# Patient Record
Sex: FEMALE | Race: WHITE | NOT HISPANIC OR LATINO | Employment: OTHER
[De-identification: names, ages, dates, MRNs, and addresses within clinical notes are randomized per-mention and may not be internally consistent; named-entity substitution may affect disease eponyms.]

---

## 2019-06-03 ENCOUNTER — TRANSCRIBE ORDERS (OUTPATIENT)
Dept: SCHEDULING | Age: 54
End: 2019-06-03

## 2019-06-03 DIAGNOSIS — M54.16 RADICULOPATHY OF LUMBAR REGION: ICD-10-CM

## 2019-06-03 DIAGNOSIS — M54.17 RADICULOPATHY OF LUMBOSACRAL REGION: Primary | ICD-10-CM

## 2020-02-25 ENCOUNTER — APPOINTMENT (RX ONLY)
Dept: URBAN - METROPOLITAN AREA CLINIC 374 | Facility: CLINIC | Age: 55
Setting detail: DERMATOLOGY
End: 2020-02-25

## 2020-02-25 DIAGNOSIS — Q826 OTHER SPECIFIED ANOMALIES OF SKIN: ICD-10-CM

## 2020-02-25 DIAGNOSIS — Q819 OTHER SPECIFIED ANOMALIES OF SKIN: ICD-10-CM

## 2020-02-25 DIAGNOSIS — L73.9 FOLLICULAR DISORDER, UNSPECIFIED: ICD-10-CM

## 2020-02-25 DIAGNOSIS — Q828 OTHER SPECIFIED ANOMALIES OF SKIN: ICD-10-CM

## 2020-02-25 DIAGNOSIS — L663 OTHER SPECIFIED DISEASES OF HAIR AND HAIR FOLLICLES: ICD-10-CM

## 2020-02-25 DIAGNOSIS — L738 OTHER SPECIFIED DISEASES OF HAIR AND HAIR FOLLICLES: ICD-10-CM

## 2020-02-25 DIAGNOSIS — L57.0 ACTINIC KERATOSIS: ICD-10-CM | Status: IMPROVED

## 2020-02-25 PROBLEM — Q82.8 OTHER SPECIFIED CONGENITAL MALFORMATIONS OF SKIN: Status: ACTIVE | Noted: 2020-02-25

## 2020-02-25 PROBLEM — L02.02 FURUNCLE OF FACE: Status: ACTIVE | Noted: 2020-02-25

## 2020-02-25 PROCEDURE — ? COUNSELING

## 2020-02-25 PROCEDURE — ? OBSERVATION

## 2020-02-25 PROCEDURE — 17000 DESTRUCT PREMALG LESION: CPT

## 2020-02-25 PROCEDURE — 99213 OFFICE O/P EST LOW 20 MIN: CPT | Mod: 25

## 2020-02-25 PROCEDURE — ? PRESCRIPTION MEDICATION MANAGEMENT

## 2020-02-25 PROCEDURE — ? PRESCRIPTION

## 2020-02-25 PROCEDURE — ? PREMALIGNANT DESTRUCTION

## 2020-02-25 PROCEDURE — 17003 DESTRUCT PREMALG LES 2-14: CPT

## 2020-02-25 RX ORDER — AMMONIUM LACTATE 12 G/100G
12 LOTION TOPICAL BID
Qty: 1 | Refills: 2 | Status: ERX | COMMUNITY
Start: 2020-02-25

## 2020-02-25 RX ADMIN — AMMONIUM LACTATE 12: 12 LOTION TOPICAL at 00:00

## 2020-02-25 ASSESSMENT — LOCATION DETAILED DESCRIPTION DERM
LOCATION DETAILED: RIGHT DISTAL PRETIBIAL REGION
LOCATION DETAILED: RIGHT MID TEMPLE
LOCATION DETAILED: RIGHT INFERIOR FOREHEAD
LOCATION DETAILED: LEFT DISTAL CALF
LOCATION DETAILED: RIGHT MEDIAL MALAR CHEEK
LOCATION DETAILED: RIGHT DISTAL CALF
LOCATION DETAILED: LEFT SUPERIOR CRUS OF ANTIHELIX
LOCATION DETAILED: LEFT INFERIOR CRUS OF ANTIHELIX
LOCATION DETAILED: RIGHT LATERAL EYEBROW
LOCATION DETAILED: RIGHT INFERIOR CRUS OF ANTIHELIX
LOCATION DETAILED: LEFT LATERAL DISTAL PRETIBIAL REGION

## 2020-02-25 ASSESSMENT — LOCATION SIMPLE DESCRIPTION DERM
LOCATION SIMPLE: RIGHT CALF
LOCATION SIMPLE: RIGHT FOREHEAD
LOCATION SIMPLE: RIGHT TEMPLE
LOCATION SIMPLE: RIGHT CHEEK
LOCATION SIMPLE: LEFT PRETIBIAL REGION
LOCATION SIMPLE: RIGHT EYEBROW
LOCATION SIMPLE: LEFT EAR
LOCATION SIMPLE: RIGHT PRETIBIAL REGION
LOCATION SIMPLE: RIGHT EAR
LOCATION SIMPLE: LEFT CALF

## 2020-02-25 ASSESSMENT — LOCATION ZONE DERM
LOCATION ZONE: EAR
LOCATION ZONE: LEG
LOCATION ZONE: FACE

## 2020-02-25 NOTE — HPI: SKIN LESION (ACTINIC KERATOSES)
Is This Lesion Biopsy Proven?: Yes
How Severe Are They?: mild
Is This A New Presentation, Or A Follow-Up?: Follow Up Actinic Keratoses

## 2020-02-25 NOTE — PROCEDURE: PRESCRIPTION MEDICATION MANAGEMENT
Render In Strict Bullet Format?: No
Detail Level: Zone
Continue Regimen: Clindamycin gel QDAY to the rashes of the ears
Initiate Treatment: Ammonium lactate lotion bid to the rashes of the body

## 2020-02-25 NOTE — PROCEDURE: PREMALIGNANT DESTRUCTION
Render Note In Bullet Format When Appropriate: No
Method: liquid nitrogen
Detail Level: Detailed
Anesthesia Volume In Cc: 0
Post-Procedure Care (Optional): The wound was cleaned, and a pressure dressing was applied.  The patient received detailed post-op instructions.
Post-Care Instructions: I reviewed with the patient in detail post-care instructions. Patient is to wear sunprotection, and avoid picking at any of the treated lesions. Pt may apply Vaseline to crusted or scabbing areas.
Consent: The patient's consent was obtained including but not limited to risks of crusting, scabbing, blistering, scarring, darker or lighter pigmentary change, recurrence, incomplete removal and infection.

## 2020-10-27 ENCOUNTER — APPOINTMENT (RX ONLY)
Dept: URBAN - METROPOLITAN AREA CLINIC 374 | Facility: CLINIC | Age: 55
Setting detail: DERMATOLOGY
End: 2020-10-27

## 2020-10-27 DIAGNOSIS — L82.1 OTHER SEBORRHEIC KERATOSIS: ICD-10-CM

## 2020-10-27 DIAGNOSIS — L21.8 OTHER SEBORRHEIC DERMATITIS: ICD-10-CM

## 2020-10-27 DIAGNOSIS — L738 OTHER SPECIFIED DISEASES OF HAIR AND HAIR FOLLICLES: ICD-10-CM

## 2020-10-27 DIAGNOSIS — D22 MELANOCYTIC NEVI: ICD-10-CM

## 2020-10-27 DIAGNOSIS — L663 OTHER SPECIFIED DISEASES OF HAIR AND HAIR FOLLICLES: ICD-10-CM

## 2020-10-27 DIAGNOSIS — D18.0 HEMANGIOMA: ICD-10-CM

## 2020-10-27 DIAGNOSIS — L81.4 OTHER MELANIN HYPERPIGMENTATION: ICD-10-CM

## 2020-10-27 DIAGNOSIS — L73.9 FOLLICULAR DISORDER, UNSPECIFIED: ICD-10-CM

## 2020-10-27 DIAGNOSIS — D17 BENIGN LIPOMATOUS NEOPLASM: ICD-10-CM

## 2020-10-27 DIAGNOSIS — Z85.828 PERSONAL HISTORY OF OTHER MALIGNANT NEOPLASM OF SKIN: ICD-10-CM

## 2020-10-27 DIAGNOSIS — Z71.89 OTHER SPECIFIED COUNSELING: ICD-10-CM

## 2020-10-27 PROBLEM — D18.01 HEMANGIOMA OF SKIN AND SUBCUTANEOUS TISSUE: Status: ACTIVE | Noted: 2020-10-27

## 2020-10-27 PROBLEM — D22.5 MELANOCYTIC NEVI OF TRUNK: Status: ACTIVE | Noted: 2020-10-27

## 2020-10-27 PROBLEM — D17.22 BENIGN LIPOMATOUS NEOPLASM OF SKIN AND SUBCUTANEOUS TISSUE OF LEFT ARM: Status: ACTIVE | Noted: 2020-10-27

## 2020-10-27 PROBLEM — D48.5 NEOPLASM OF UNCERTAIN BEHAVIOR OF SKIN: Status: ACTIVE | Noted: 2020-10-27

## 2020-10-27 PROBLEM — L02.222 FURUNCLE OF BACK [ANY PART, EXCEPT BUTTOCK]: Status: ACTIVE | Noted: 2020-10-27

## 2020-10-27 PROBLEM — L02.423 FURUNCLE OF RIGHT UPPER LIMB: Status: ACTIVE | Noted: 2020-10-27

## 2020-10-27 PROBLEM — L02.12 FURUNCLE OF NECK: Status: ACTIVE | Noted: 2020-10-27

## 2020-10-27 PROBLEM — L02.426 FURUNCLE OF LEFT LOWER LIMB: Status: ACTIVE | Noted: 2020-10-27

## 2020-10-27 PROBLEM — L02.425 FURUNCLE OF RIGHT LOWER LIMB: Status: ACTIVE | Noted: 2020-10-27

## 2020-10-27 PROCEDURE — 99213 OFFICE O/P EST LOW 20 MIN: CPT | Mod: 25

## 2020-10-27 PROCEDURE — ? PRESCRIPTION

## 2020-10-27 PROCEDURE — ? BIOPSY BY SHAVE METHOD

## 2020-10-27 PROCEDURE — ? FULL BODY SKIN EXAM

## 2020-10-27 PROCEDURE — ? COUNSELING

## 2020-10-27 PROCEDURE — ? PRESCRIPTION MEDICATION MANAGEMENT

## 2020-10-27 PROCEDURE — 11102 TANGNTL BX SKIN SINGLE LES: CPT

## 2020-10-27 PROCEDURE — ? PHOTO-DOCUMENTATION

## 2020-10-27 RX ORDER — HYDROCORTISONE 25 MG/G
CREAM TOPICAL BID
Qty: 1 | Refills: 3 | Status: ERX | COMMUNITY
Start: 2020-10-27

## 2020-10-27 RX ORDER — CLINDAMYCIN PHOSPHATE 10 MG/G
GEL TOPICAL
Qty: 1 | Refills: 3 | Status: ERX | COMMUNITY
Start: 2020-10-27

## 2020-10-27 RX ADMIN — HYDROCORTISONE: 25 CREAM TOPICAL at 00:00

## 2020-10-27 RX ADMIN — CLINDAMYCIN PHOSPHATE: 10 GEL TOPICAL at 00:00

## 2020-10-27 ASSESSMENT — LOCATION DETAILED DESCRIPTION DERM
LOCATION DETAILED: LEFT SUPERIOR LATERAL UPPER BACK
LOCATION DETAILED: RIGHT ANTERIOR DISTAL THIGH
LOCATION DETAILED: RIGHT LATERAL SUPERIOR CHEST
LOCATION DETAILED: RIGHT PROXIMAL POSTERIOR UPPER ARM
LOCATION DETAILED: RIGHT SUPERIOR UPPER BACK
LOCATION DETAILED: RIGHT ANTERIOR PROXIMAL UPPER ARM
LOCATION DETAILED: LEFT ANTERIOR DISTAL THIGH
LOCATION DETAILED: LEFT ANTECUBITAL SKIN
LOCATION DETAILED: EPIGASTRIC SKIN
LOCATION DETAILED: RIGHT MEDIAL TRAPEZIAL NECK
LOCATION DETAILED: LEFT CYMBA CONCHA
LOCATION DETAILED: LEFT ANTERIOR PROXIMAL THIGH
LOCATION DETAILED: RIGHT INFERIOR MEDIAL UPPER BACK

## 2020-10-27 ASSESSMENT — LOCATION SIMPLE DESCRIPTION DERM
LOCATION SIMPLE: ABDOMEN
LOCATION SIMPLE: LEFT THIGH
LOCATION SIMPLE: RIGHT UPPER ARM
LOCATION SIMPLE: RIGHT POSTERIOR UPPER ARM
LOCATION SIMPLE: CHEST
LOCATION SIMPLE: RIGHT THIGH
LOCATION SIMPLE: LEFT UPPER ARM
LOCATION SIMPLE: POSTERIOR NECK
LOCATION SIMPLE: LEFT EAR
LOCATION SIMPLE: LEFT UPPER BACK
LOCATION SIMPLE: RIGHT UPPER BACK

## 2020-10-27 ASSESSMENT — LOCATION ZONE DERM
LOCATION ZONE: EAR
LOCATION ZONE: LEG
LOCATION ZONE: TRUNK
LOCATION ZONE: NECK
LOCATION ZONE: ARM

## 2020-10-27 NOTE — PROCEDURE: PRESCRIPTION MEDICATION MANAGEMENT
Initiate Treatment: hydrocortisone 2.5 % topical cream: Apply a thin layer BID to affected areas of ears
Detail Level: Zone
Render In Strict Bullet Format?: No
Continue Regimen: clindamycin 1 % topical gel: Apply to affected area of chest bid
Initiate Treatment: hydrocortisone 2.5 % topical cream: Apply a thin layer BID to affected areas of arms

## 2021-05-09 ENCOUNTER — HOSPITAL ENCOUNTER (EMERGENCY)
Facility: HOSPITAL | Age: 56
Discharge: HOME | End: 2021-05-09
Attending: EMERGENCY MEDICINE
Payer: MEDICARE

## 2021-05-09 ENCOUNTER — APPOINTMENT (EMERGENCY)
Dept: RADIOLOGY | Facility: HOSPITAL | Age: 56
End: 2021-05-09
Attending: EMERGENCY MEDICINE
Payer: MEDICARE

## 2021-05-09 VITALS
OXYGEN SATURATION: 97 % | DIASTOLIC BLOOD PRESSURE: 77 MMHG | TEMPERATURE: 98 F | RESPIRATION RATE: 15 BRPM | HEIGHT: 58 IN | BODY MASS INDEX: 47.65 KG/M2 | HEART RATE: 108 BPM | SYSTOLIC BLOOD PRESSURE: 131 MMHG | WEIGHT: 227 LBS

## 2021-05-09 DIAGNOSIS — R07.9 CHEST PAIN, UNSPECIFIED TYPE: Primary | ICD-10-CM

## 2021-05-09 LAB
ALBUMIN SERPL-MCNC: 4.1 G/DL (ref 3.4–5)
ALP SERPL-CCNC: 70 IU/L (ref 35–126)
ALT SERPL-CCNC: 37 IU/L (ref 11–54)
ANION GAP SERPL CALC-SCNC: 9 MEQ/L (ref 3–15)
AST SERPL-CCNC: 24 IU/L (ref 15–41)
BASOPHILS # BLD: 0.06 K/UL (ref 0.01–0.1)
BASOPHILS NFR BLD: 0.8 %
BILIRUB SERPL-MCNC: 0.4 MG/DL (ref 0.3–1.2)
BNP SERPL-MCNC: 10 PG/ML
BUN SERPL-MCNC: 15 MG/DL (ref 8–20)
CALCIUM SERPL-MCNC: 8.6 MG/DL (ref 8.9–10.3)
CHLORIDE SERPL-SCNC: 102 MEQ/L (ref 98–109)
CO2 SERPL-SCNC: 28 MEQ/L (ref 22–32)
CREAT SERPL-MCNC: 1 MG/DL (ref 0.6–1.1)
D DIMER PPP IA.FEU-MCNC: 0.37 UG/ML FEU (ref 0–0.5)
DIFFERENTIAL METHOD BLD: NORMAL
EOSINOPHIL # BLD: 0.36 K/UL (ref 0.04–0.36)
EOSINOPHIL NFR BLD: 4.9 %
ERYTHROCYTE [DISTWIDTH] IN BLOOD BY AUTOMATED COUNT: 13 % (ref 11.7–14.4)
GFR SERPL CREATININE-BSD FRML MDRD: 57.8 ML/MIN/1.73M*2
GLUCOSE SERPL-MCNC: 161 MG/DL (ref 70–99)
HCT VFR BLDCO AUTO: 42.7 % (ref 35–45)
HGB BLD-MCNC: 13.9 G/DL (ref 11.8–15.7)
IMM GRANULOCYTES # BLD AUTO: 0.03 K/UL (ref 0–0.08)
IMM GRANULOCYTES NFR BLD AUTO: 0.4 %
LYMPHOCYTES # BLD: 2.97 K/UL (ref 1.2–3.5)
LYMPHOCYTES NFR BLD: 40.1 %
MCH RBC QN AUTO: 29.9 PG (ref 28–33.2)
MCHC RBC AUTO-ENTMCNC: 32.6 G/DL (ref 32.2–35.5)
MCV RBC AUTO: 91.8 FL (ref 83–98)
MONOCYTES # BLD: 0.69 K/UL (ref 0.28–0.8)
MONOCYTES NFR BLD: 9.3 %
NEUTROPHILS # BLD: 3.3 K/UL (ref 1.7–7)
NEUTS SEG NFR BLD: 44.5 %
NRBC BLD-RTO: 0 %
PDW BLD AUTO: 9.9 FL (ref 9.4–12.3)
PLATELET # BLD AUTO: 264 K/UL (ref 150–369)
POTASSIUM SERPL-SCNC: 3.5 MEQ/L (ref 3.6–5.1)
PROT SERPL-MCNC: 8 G/DL (ref 6–8.2)
RBC # BLD AUTO: 4.65 M/UL (ref 3.93–5.22)
SODIUM SERPL-SCNC: 139 MEQ/L (ref 136–144)
TROPONIN I SERPL-MCNC: <0.02 NG/ML
WBC # BLD AUTO: 7.41 K/UL (ref 3.8–10.5)

## 2021-05-09 PROCEDURE — 80053 COMPREHEN METABOLIC PANEL: CPT | Performed by: EMERGENCY MEDICINE

## 2021-05-09 PROCEDURE — 85379 FIBRIN DEGRADATION QUANT: CPT | Performed by: PHYSICIAN ASSISTANT

## 2021-05-09 PROCEDURE — 71046 X-RAY EXAM CHEST 2 VIEWS: CPT

## 2021-05-09 PROCEDURE — 3E0337Z INTRODUCTION OF ELECTROLYTIC AND WATER BALANCE SUBSTANCE INTO PERIPHERAL VEIN, PERCUTANEOUS APPROACH: ICD-10-PCS | Performed by: EMERGENCY MEDICINE

## 2021-05-09 PROCEDURE — 36415 COLL VENOUS BLD VENIPUNCTURE: CPT | Performed by: EMERGENCY MEDICINE

## 2021-05-09 PROCEDURE — 96374 THER/PROPH/DIAG INJ IV PUSH: CPT

## 2021-05-09 PROCEDURE — 85025 COMPLETE CBC W/AUTO DIFF WBC: CPT | Performed by: EMERGENCY MEDICINE

## 2021-05-09 PROCEDURE — 93005 ELECTROCARDIOGRAM TRACING: CPT | Performed by: EMERGENCY MEDICINE

## 2021-05-09 PROCEDURE — 84484 ASSAY OF TROPONIN QUANT: CPT | Performed by: EMERGENCY MEDICINE

## 2021-05-09 PROCEDURE — 83880 ASSAY OF NATRIURETIC PEPTIDE: CPT | Performed by: EMERGENCY MEDICINE

## 2021-05-09 PROCEDURE — 99284 EMERGENCY DEPT VISIT MOD MDM: CPT | Mod: 25

## 2021-05-09 PROCEDURE — 63600000 HC DRUGS/DETAIL CODE: Performed by: PHYSICIAN ASSISTANT

## 2021-05-09 RX ORDER — LIRAGLUTIDE 6 MG/ML
1.8 INJECTION SUBCUTANEOUS DAILY
COMMUNITY
End: 2023-04-17

## 2021-05-09 RX ORDER — BREXPIPRAZOLE 4 MG/1
4 TABLET ORAL NIGHTLY
COMMUNITY

## 2021-05-09 RX ORDER — METFORMIN HYDROCHLORIDE 500 MG/1
500 TABLET, EXTENDED RELEASE ORAL 2 TIMES DAILY WITH MEALS
COMMUNITY

## 2021-05-09 RX ORDER — CLONAZEPAM 0.5 MG/1
0.5 TABLET ORAL NIGHTLY
COMMUNITY

## 2021-05-09 RX ORDER — AMLODIPINE BESYLATE 2.5 MG/1
2.5 TABLET ORAL AS NEEDED
COMMUNITY

## 2021-05-09 RX ORDER — ASPIRIN 81 MG/1
81 TABLET ORAL NIGHTLY
COMMUNITY

## 2021-05-09 RX ORDER — TRANYLCYPROMINE 10 MG/1
20 TABLET ORAL 3 TIMES DAILY
COMMUNITY

## 2021-05-09 RX ORDER — ASCORBIC ACID 125 MG
170 TABLET,CHEWABLE ORAL NIGHTLY
COMMUNITY
End: 2023-04-17

## 2021-05-09 RX ORDER — NAPROXEN SODIUM 220 MG/1
TABLET ORAL
COMMUNITY
End: 2023-04-17 | Stop reason: ENTERED-IN-ERROR

## 2021-05-09 RX ORDER — GABAPENTIN 300 MG/1
300 CAPSULE ORAL
COMMUNITY

## 2021-05-09 RX ORDER — KETOROLAC TROMETHAMINE 30 MG/ML
30 INJECTION, SOLUTION INTRAMUSCULAR; INTRAVENOUS ONCE
Status: COMPLETED | OUTPATIENT
Start: 2021-05-09 | End: 2021-05-09

## 2021-05-09 RX ORDER — ARIPIPRAZOLE 5 MG/1
5 TABLET ORAL
COMMUNITY

## 2021-05-09 RX ORDER — INSULIN DEGLUDEC 100 U/ML
25 INJECTION, SOLUTION SUBCUTANEOUS NIGHTLY
COMMUNITY

## 2021-05-09 RX ORDER — INSULIN ASPART 100 [IU]/ML
INJECTION, SOLUTION INTRAVENOUS; SUBCUTANEOUS
COMMUNITY

## 2021-05-09 RX ORDER — DOCUSATE SODIUM 100 MG/1
100 CAPSULE, LIQUID FILLED ORAL AS NEEDED
COMMUNITY
End: 2023-04-17

## 2021-05-09 RX ADMIN — KETOROLAC TROMETHAMINE 30 MG: 30 INJECTION, SOLUTION INTRAMUSCULAR; INTRAVENOUS at 10:27

## 2021-05-09 ASSESSMENT — ENCOUNTER SYMPTOMS
FEVER: 0
COUGH: 0
WHEEZING: 0
NECK PAIN: 0
SHORTNESS OF BREATH: 0
ACTIVITY CHANGE: 0
DIARRHEA: 0
SEIZURES: 0
HEMATURIA: 0
VOMITING: 0
DIFFICULTY URINATING: 0
ABDOMINAL PAIN: 0
NAUSEA: 0
SORE THROAT: 0
AGITATION: 0
HEADACHES: 0
COLOR CHANGE: 0
FACIAL SWELLING: 0
DIAPHORESIS: 0
BACK PAIN: 0
SPEECH DIFFICULTY: 0
WEAKNESS: 0

## 2021-05-09 NOTE — ED PROVIDER NOTES
"Emergency Medicine Note  HPI   HISTORY OF PRESENT ILLNESS     HPI   56-year-old female with past medical history of anxiety, depression, hypertension, DM2, cognitive impairment, CAD who presents for evaluation of right-sided chest pain.  States while \"pouring my meds\" she developed sudden onset right-sided chest pain described as sharp pain with radiation to her right back.  Pain was intense for approximately an hour, seemed to improve in route here and currently denies pain.  She reports intermittent pain in her right arm yesterday.  Denies injury, trauma or strain.  Admits to slight shortness of breath she attributes to the mask.  Denies fever, cough, lower extremity swelling, pain or rash, recent hospitalizations/surgeries or travel. No personal history of DVT or PE.  Her heart rate here is  ~112.  She notes she does have an elevated heart rate that is usually around 100.      CardiologistHamilton Fried         Patient History   PAST HISTORY     Reviewed from Nursing Triage:      Past Medical History:   Diagnosis Date   • CHF (congestive heart failure) (CMS/HCC)    • Coronary artery disease    • Myocardial infarct (CMS/HCC)    • Sleep apnea    • Stroke (CMS/HCC)    • Type 2 diabetes mellitus (CMS/HCC)        History reviewed. No pertinent surgical history.    History reviewed. No pertinent family history.    Social History     Tobacco Use   • Smoking status: Never Smoker   • Smokeless tobacco: Never Used   Substance Use Topics   • Alcohol use: Never   • Drug use: Never         Review of Systems   REVIEW OF SYSTEMS     Review of Systems   Constitutional: Negative for activity change, diaphoresis and fever.   HENT: Negative for facial swelling and sore throat.    Eyes: Negative for visual disturbance.   Respiratory: Negative for cough, shortness of breath and wheezing.    Cardiovascular: Positive for chest pain. Negative for leg swelling.   Gastrointestinal: Negative for abdominal pain, diarrhea, nausea and vomiting. "   Genitourinary: Negative for difficulty urinating and hematuria.   Musculoskeletal: Negative for back pain and neck pain.   Skin: Negative for color change and pallor.   Neurological: Negative for seizures, syncope, speech difficulty, weakness and headaches.   Psychiatric/Behavioral: Negative for agitation and behavioral problems.   All other systems reviewed and are negative.        VITALS     ED Vitals    Date/Time Temp Pulse Resp BP SpO2 [facility]   05/09/21 0818 36.7 °C (98 °F) 119 18 143/85 100 % RB                       Physical Exam   PHYSICAL EXAM     Physical Exam  Vitals and nursing note reviewed.   Constitutional:       Appearance: She is obese.   HENT:      Head: Normocephalic and atraumatic.   Eyes:      Conjunctiva/sclera: Conjunctivae normal.   Cardiovascular:      Rate and Rhythm: Normal rate and regular rhythm.   Pulmonary:      Effort: Pulmonary effort is normal.      Breath sounds: Normal breath sounds.   Abdominal:      General: There is no distension.   Musculoskeletal:         General: No tenderness or deformity. Normal range of motion.      Cervical back: Normal range of motion.      Comments:  No calf or thigh swelling, erythema, tenderness, palpable cords bilaterally   Skin:     General: Skin is warm and dry.   Neurological:      General: No focal deficit present.      Mental Status: She is alert. Mental status is at baseline.   Psychiatric:         Behavior: Behavior normal.           PROCEDURES     ECG 12 lead    Date/Time: 5/9/2021 10:29 AM  Performed by: Jessica Prieto PA C  Authorized by: Lynsey Arita MD     Rate:     ECG rate:  122    ECG rate assessment: tachycardic    Rhythm:     Rhythm: sinus tachycardia    Ectopy:     Ectopy: none    QRS:     QRS axis:  Normal  ST segments:     ST segments:  Normal  T waves:     T waves: normal           DATA     Results     None          Imaging Results    None         ECG 12 lead    (Results Pending)       Scoring tools                                  ED Course & MDM   MDM / ED COURSE and CLINICAL IMPRESSIONS     [facility]    ED Course as of May 09 1034   Sun May 09, 2021   0845 Impression/Plan:    CP sudden onset while at rest x 1 hour. Subsided en route here. Currently denies chest pain. Ecg, cardiac monitor, labs, CXR, reeval    [JV]   0848 Pharmacologic myocardial perfusion test 9/2020:  Borderline findings in the anterior wall which most likely represent   breast artifact.  Defects are created by attenuation correction in the   setting of low counts.  No ischemia on the raw images but mild severity   anterior defect most likely related to breast artifact obscures portions   of the anterior wall.   The results are consistent with low cardiac risk findings.    When compared to the study from 09/13/2017, the previously noted anterior   mid wall thinning and anteroseptal thinning is still appreciated in the   anterior wall but not in the anteroseptum.  Attenuation correction no   longer improves the artifact but actually appears to create artifact.     Clinical correlation is needed due to the anterior wall thinning and   breast artifact.       [JV]   0912 WBC: 7.41 [JV]   0912 Troponin I: <0.02 [JV]   0928 D-Dimer, Quant: 0.37 [JV]   0947 CXR IMPRESSION: No acute interval radiographic change or abnormality is demonstrated  in the chest.  Clinical correlation advised.  See comment.    [JV]   0954 Labs unremarkable including D-dimer and troponin.  She does intermittently have recurrent chest pain while in the ED here.  Currently a 3 out of 10.  We will try Toradol prior to departure    [JV]   0954 Chest pain improved from a 7 to a 2 out of 10.  Patient resting comfortably in bed in no acute distress.  Heart rate improved from 119 to 104.  She notes this is her baseline heart rate.  Comfortable with plan for discharge home, close follow-up with cardiologist.  Return precautions discussed.    [JV]      ED Course User Index  [JV] Jessica Prieto PA C          Clinical Impressions as of May 09 1034   Chest pain, unspecified type            Jessica Prieto PA C  05/09/21 1032

## 2021-05-09 NOTE — ED ATTESTATION NOTE
-----------------------------------------------------------  ED Attending Note  5/9/2021, 9:06 AM    I supervised care provided by the PA (Ida). We have discussed the case. I have reviewed their note and agree with the plan of treatment. I personally interviewed the patient and examined the patient.    HISTORY OF PRESENT ILLNESS     Chief Complaint   Patient presents with   • Chest Pain     Laurence Chang is a 56 y.o. female with the PMH below who comes to the ED today with the chief complaint of sudden onset of a sharp R sided chest pain that radiated to her back. Sx began at 6am, lasted for about an hour and then subsided. Arrived in ED pain free. It was 3/10 at worst. No f/c, LE pain/swelling. H/o ST, baseline .    PAST HISTORY     Past Medical History:   Diagnosis Date   • CHF (congestive heart failure) (CMS/[facility])    • Coronary artery disease    • Myocardial infarct (CMS/HCC)    • Sleep apnea    • Stroke (CMS/HCC)    • Type 2 diabetes mellitus (CMS/[facility])      There is no problem list on file for this patient.    History reviewed. No pertinent surgical history.    VITAL SIGNS     ED Vitals    Date/Time Temp Pulse Resp BP SpO2 [facility]   05/09/21 1015 -- 108 15 131/77 97 % RB   05/09/21 0945 -- 104 16 143/78 99 % RB   05/09/21 0858 -- 106 18 -- 100 % RB   05/09/21 0818 36.7 °C (98 °F) 119 18 143/85 100 % RB          PHYSICAL EXAM   Physical Exam  Vitals reviewed.   HENT:      Head: Normocephalic and atraumatic.   Cardiovascular:      Rate and Rhythm: Regular rhythm. Tachycardia present.      Heart sounds: Normal heart sounds.   Pulmonary:      Effort: Pulmonary effort is normal. No respiratory distress.   Abdominal:      Palpations: Abdomen is soft.      Tenderness: There is no abdominal tenderness.   Neurological:      Mental Status: She is alert and oriented to person, place, and time.       DATA     Vital sign review: SpO2: 100 % on room air. Interpretation: normal    Results     Procedure Component Value  Units Date/Time    D-dimer, quantitative [[id number]]  (Normal) Collected: 05/09/21 0839    Specimen: Blood, Venous Updated: 05/09/21 0922     D-Dimer, Quant 0.37 ug/mL FEU      Comment: Suggested Age Related Reference Range: 0.00 - 0.54  The D-Dimer assay can be used as an aid in the diagnosis of DVT or PE. The test can not be used by itself to exclude DVT or PE. When used as a diagnostic aid, the cutoff value is the same as the reference range: <0.5 ug/ml FEU.       B-type natriuretic peptide [[id number]]  (Normal) Collected: 05/09/21 0839    Specimen: Blood, Venous Updated: 05/09/21 0914     BNP 10 pg/mL     Troponin I [[id number]]  (Normal) Collected: 05/09/21 0839    Specimen: Blood, Venous Updated: 05/09/21 0911     Troponin I <0.02 ng/mL     Comprehensive metabolic panel [[id number]]  (Abnormal) Collected: 05/09/21 0839    Specimen: Blood, Venous Updated: 05/09/21 0905     Sodium 139 mEQ/L      Potassium 3.5 mEQ/L      Comment: Results obtained on plasma. Plasma Potassium values may be up to 0.4 mEQ/L less than serum values. The differences may be greater for patients with high platelet or white cell counts.        Chloride 102 mEQ/L      CO2 28 mEQ/L      BUN 15 mg/dL      Creatinine 1.0 mg/dL      Glucose 161 mg/dL      Calcium 8.6 mg/dL      AST (SGOT) 24 IU/L      ALT (SGPT) 37 IU/L      Alkaline Phosphatase 70 IU/L      Total Protein 8.0 g/dL      Comment: Test performed on plasma which typically contains approximately 0.4 g/dL more protein than serum.        Albumin 4.1 g/dL      Bilirubin, Total 0.4 mg/dL      eGFR 57.8 mL/min/1.73m*2      Anion Gap 9 mEQ/L     CBC and Differential [[id number]] Collected: 05/09/21 0839    Specimen: Blood, Venous Updated: 05/09/21 0853     WBC 7.41 K/uL      RBC 4.65 M/uL      Hemoglobin 13.9 g/dL      Hematocrit 42.7 %      MCV 91.8 fL      MCH 29.9 pg      MCHC 32.6 g/dL      RDW 13.0 %      Platelets 264 K/uL      MPV 9.9 fL      Differential Type Auto     nRBC 0.0 %       Immature Granulocytes 0.4 %      Neutrophils 44.5 %      Lymphocytes 40.1 %      Monocytes 9.3 %      Eosinophils 4.9 %      Basophils 0.8 %      Immature Granulocytes, Absolute 0.03 K/uL      Neutrophils, Absolute 3.30 K/uL      Lymphocytes, Absolute 2.97 K/uL      Monocytes, Absolute 0.69 K/uL      Eosinophils, Absolute 0.36 K/uL      Basophils, Absolute 0.06 K/uL     RAINBOW RED [[id number]] Collected: 05/09/21 0839    Specimen: Blood, Venous Updated: 05/09/21 0845    RAINBOW LT BLUE [[id number]] Collected: 05/09/21 0839    Specimen: Blood, Venous Updated: 05/09/21 0845    [place] Draw Panel [[id number]] Collected: 05/09/21 0839    Specimen: Blood, Venous Updated: 05/09/21 0845    Narrative:      The following orders were created for panel order [place] Draw Panel.  Procedure                               Abnormality         Status                     ---------                               -----------         ------                     RAINBOW RED[[id number]]                                      In process                 RAINBOW LT BLUE[[id number]]                                  In process                 RAINBOW GOLD[[id number]]                                     In process                   Please view results for these tests on the individual orders.    RAINBOW GOLD [[id number]] Collected: 05/09/21 0839    Specimen: Blood, Venous Updated: 05/09/21 0845        ECG 12 lead   ED Interpretation   Jessica Prieto PA C     5/9/2021 10:35 AM   ECG 12 lead      Date/Time: 5/9/2021 10:29 AM   Performed by: Jessica Prieto PA C   Authorized by: Lynsey Arita MD       Rate:      ECG rate:  122     ECG rate assessment: tachycardic     Rhythm:      Rhythm: sinus tachycardia     Ectopy:      Ectopy: none     QRS:      QRS axis:  Normal   ST segments:      ST segments:  Normal   T waves:      T waves: normal          Imaging Results          X-RAY CHEST 2 VIEWS (Final result)  Result time 05/09/21 09:33:22    Final  result                 Impression:    IMPRESSION: No acute interval radiographic change or abnormality is demonstrated  in the chest.  Clinical correlation advised.  See comment.                 Narrative:    CLINICAL HISTORY: Chest pain    COMMENT: PA erect frontal and lateral views the chest were obtained.  There are  previous remote radiographs of September 2009.    Lungs remain moderately well expanded.  They are symmetrically aerated.  There  is no new parenchymal abnormality.  There is no pleural fluid or pleural air.  Central vasculature is again mildly prominent.  There is stable midline without  significant change or abnormality.  Overlying monitoring leads and wires are  present.  There is no acute osseous or soft tissue change.                                   MDM / ED COURSE and CLINICAL IMPRESSIONS     ED Course as of May 09 1557   Sun May 09, 2021   0845 Impression/Plan:    CP sudden onset while at rest x 1 hour. Subsided en route here. Currently denies chest pain. Ecg, cardiac monitor, labs, CXR, reeval    [JV]   0848 Pharmacologic myocardial perfusion test 9/2020:  Borderline findings in the anterior wall which most likely represent   breast artifact.  Defects are created by attenuation correction in the   setting of low counts.  No ischemia on the raw images but mild severity   anterior defect most likely related to breast artifact obscures portions   of the anterior wall.   The results are consistent with low cardiac risk findings.    When compared to the study from 09/13/2017, the previously noted anterior   mid wall thinning and anteroseptal thinning is still appreciated in the   anterior wall but not in the anteroseptum.  Attenuation correction no   longer improves the artifact but actually appears to create artifact.     Clinical correlation is needed due to the anterior wall thinning and   breast artifact.       [JV]   0912 WBC: 7.41 [JV]   0912 Troponin I: <0.02 [JV]   0928 D-Dimer, Quant:  0.37 [JV]   0947 CXR IMPRESSION: No acute interval radiographic change or abnormality is demonstrated  in the chest.  Clinical correlation advised.  See comment.    [JV]   0954 Labs unremarkable including D-dimer and troponin.  She does intermittently have recurrent chest pain while in the ED here.  Currently a 3 out of 10.  We will try Toradol prior to departure    [JV]   0954 Chest pain improved from a 7 to a 2 out of 10.  Patient resting comfortably in bed in no acute distress.  Heart rate improved from 119 to 104.  She notes this is her baseline heart rate.  Comfortable with plan for discharge home, close follow-up with cardiologist.  Return precautions discussed.    [JV]      ED Course User Index  [JV] Jessica Prieto PA C         Clinical Impressions as of May 09 1557   Chest pain, unspecified type       -----------------------------  Lynsey Arita MD  5/9/2021, 9:06 AM  -----------------------------------------------------------     Lynsey Arita MD  05/14/21 1312

## 2021-05-09 NOTE — DISCHARGE INSTRUCTIONS
Your lab work, chest x-ray were within normal limits.  Take acetaminophen (ie: Tylenol) and ibuprofen (ie: Advil or Motrin) as per package instructions for pain control.  You should not take more than 3 g of acetaminophen or 2400mg ibuprofren in 24 hours.  Always take ibuprofen with something in your stomach. Please be sure to drink plenty of fluids.   Follow-up with your family doctor and cardiologist regarding today's visit.  Return with any new or concerning symptoms.

## 2021-05-10 LAB
ATRIAL RATE: 122
P AXIS: 57
PR INTERVAL: 150
QRS DURATION: 78
QT INTERVAL: 332
QTC CALCULATION(BAZETT): 473
R AXIS: 33
T WAVE AXIS: -4
VENTRICULAR RATE: 122

## 2021-06-04 ENCOUNTER — APPOINTMENT (RX ONLY)
Dept: URBAN - METROPOLITAN AREA CLINIC 374 | Facility: CLINIC | Age: 56
Setting detail: DERMATOLOGY
End: 2021-06-04

## 2021-06-04 DIAGNOSIS — L57.8 OTHER SKIN CHANGES DUE TO CHRONIC EXPOSURE TO NONIONIZING RADIATION: ICD-10-CM

## 2021-06-04 DIAGNOSIS — D17 BENIGN LIPOMATOUS NEOPLASM: ICD-10-CM

## 2021-06-04 PROBLEM — D17.22 BENIGN LIPOMATOUS NEOPLASM OF SKIN AND SUBCUTANEOUS TISSUE OF LEFT ARM: Status: ACTIVE | Noted: 2021-06-04

## 2021-06-04 PROCEDURE — ? TREATMENT REGIMEN

## 2021-06-04 PROCEDURE — ? SUNSCREEN RECOMMENDATIONS

## 2021-06-04 PROCEDURE — 99213 OFFICE O/P EST LOW 20 MIN: CPT

## 2021-06-04 PROCEDURE — ? COUNSELING

## 2021-06-04 ASSESSMENT — LOCATION SIMPLE DESCRIPTION DERM
LOCATION SIMPLE: RIGHT FOREARM
LOCATION SIMPLE: LEFT FOREARM
LOCATION SIMPLE: LEFT UPPER ARM

## 2021-06-04 ASSESSMENT — LOCATION DETAILED DESCRIPTION DERM
LOCATION DETAILED: RIGHT PROXIMAL DORSAL FOREARM
LOCATION DETAILED: LEFT ANTECUBITAL SKIN
LOCATION DETAILED: LEFT PROXIMAL DORSAL FOREARM

## 2021-06-04 ASSESSMENT — LOCATION ZONE DERM: LOCATION ZONE: ARM

## 2021-06-04 NOTE — PROCEDURE: TREATMENT REGIMEN
Detail Level: Simple
Plan: Get copy of records including any ultrasound reports for lesion of left arm that were done at [place] [facility].  T/C referral to general surgery for further evaluation and removal of lesion due to location at antecubital fossa.  Discussed with patient that I prefer she see surgeon because this location of the body contains several nerves and makes this surgery a little more complicated and risky to due in our office
Plan: Sunscreen use

## 2021-08-17 ENCOUNTER — APPOINTMENT (EMERGENCY)
Dept: RADIOLOGY | Facility: HOSPITAL | Age: 56
End: 2021-08-17
Payer: MEDICARE

## 2021-08-17 ENCOUNTER — HOSPITAL ENCOUNTER (EMERGENCY)
Facility: HOSPITAL | Age: 56
Discharge: HOME | End: 2021-08-17
Attending: EMERGENCY MEDICINE
Payer: MEDICARE

## 2021-08-17 VITALS
WEIGHT: 219 LBS | DIASTOLIC BLOOD PRESSURE: 70 MMHG | TEMPERATURE: 98.6 F | RESPIRATION RATE: 18 BRPM | SYSTOLIC BLOOD PRESSURE: 130 MMHG | BODY MASS INDEX: 45.97 KG/M2 | HEIGHT: 58 IN | OXYGEN SATURATION: 97 % | HEART RATE: 112 BPM

## 2021-08-17 DIAGNOSIS — R07.89 CHEST WALL PAIN: Primary | ICD-10-CM

## 2021-08-17 LAB
ALBUMIN SERPL-MCNC: 4.1 G/DL (ref 3.4–5)
ALP SERPL-CCNC: 55 IU/L (ref 35–126)
ALT SERPL-CCNC: 36 IU/L (ref 11–54)
ANION GAP SERPL CALC-SCNC: 10 MEQ/L (ref 3–15)
AST SERPL-CCNC: 27 IU/L (ref 15–41)
BASOPHILS # BLD: 0.06 K/UL (ref 0.01–0.1)
BASOPHILS NFR BLD: 0.7 %
BILIRUB SERPL-MCNC: 0.6 MG/DL (ref 0.3–1.2)
BNP SERPL-MCNC: 15 PG/ML
BUN SERPL-MCNC: 17 MG/DL (ref 8–20)
CALCIUM SERPL-MCNC: 9.2 MG/DL (ref 8.9–10.3)
CHLORIDE SERPL-SCNC: 102 MEQ/L (ref 98–109)
CO2 SERPL-SCNC: 25 MEQ/L (ref 22–32)
CREAT SERPL-MCNC: 0.8 MG/DL (ref 0.6–1.1)
DIFFERENTIAL METHOD BLD: ABNORMAL
EOSINOPHIL # BLD: 0.4 K/UL (ref 0.04–0.36)
EOSINOPHIL NFR BLD: 5 %
ERYTHROCYTE [DISTWIDTH] IN BLOOD BY AUTOMATED COUNT: 12.1 % (ref 11.7–14.4)
GFR SERPL CREATININE-BSD FRML MDRD: >60 ML/MIN/1.73M*2
GLUCOSE SERPL-MCNC: 182 MG/DL (ref 70–99)
HCT VFR BLDCO AUTO: 40.5 % (ref 35–45)
HGB BLD-MCNC: 13.2 G/DL (ref 11.8–15.7)
IMM GRANULOCYTES # BLD AUTO: 0.03 K/UL (ref 0–0.08)
IMM GRANULOCYTES NFR BLD AUTO: 0.4 %
LYMPHOCYTES # BLD: 2.58 K/UL (ref 1.2–3.5)
LYMPHOCYTES NFR BLD: 32.1 %
MCH RBC QN AUTO: 29.8 PG (ref 28–33.2)
MCHC RBC AUTO-ENTMCNC: 32.6 G/DL (ref 32.2–35.5)
MCV RBC AUTO: 91.4 FL (ref 83–98)
MONOCYTES # BLD: 0.64 K/UL (ref 0.28–0.8)
MONOCYTES NFR BLD: 8 %
NEUTROPHILS # BLD: 4.32 K/UL (ref 1.7–7)
NEUTS SEG NFR BLD: 53.8 %
NRBC BLD-RTO: 0 %
PDW BLD AUTO: 10.4 FL (ref 9.4–12.3)
PLATELET # BLD AUTO: 277 K/UL (ref 150–369)
POTASSIUM SERPL-SCNC: 4.5 MEQ/L (ref 3.6–5.1)
PROT SERPL-MCNC: 7.7 G/DL (ref 6–8.2)
RBC # BLD AUTO: 4.43 M/UL (ref 3.93–5.22)
SODIUM SERPL-SCNC: 137 MEQ/L (ref 136–144)
T4 FREE SERPL-MCNC: 0.79 NG/DL (ref 0.58–1.64)
TROPONIN I SERPL-MCNC: <0.02 NG/ML
TSH SERPL DL<=0.05 MIU/L-ACNC: 0.07 MIU/L (ref 0.34–5.6)
WBC # BLD AUTO: 8.03 K/UL (ref 3.8–10.5)

## 2021-08-17 PROCEDURE — 80053 COMPREHEN METABOLIC PANEL: CPT | Performed by: EMERGENCY MEDICINE

## 2021-08-17 PROCEDURE — 85025 COMPLETE CBC W/AUTO DIFF WBC: CPT | Performed by: EMERGENCY MEDICINE

## 2021-08-17 PROCEDURE — 3E0337Z INTRODUCTION OF ELECTROLYTIC AND WATER BALANCE SUBSTANCE INTO PERIPHERAL VEIN, PERCUTANEOUS APPROACH: ICD-10-PCS | Performed by: EMERGENCY MEDICINE

## 2021-08-17 PROCEDURE — 84484 ASSAY OF TROPONIN QUANT: CPT | Performed by: EMERGENCY MEDICINE

## 2021-08-17 PROCEDURE — 83880 ASSAY OF NATRIURETIC PEPTIDE: CPT | Performed by: PHYSICIAN ASSISTANT

## 2021-08-17 PROCEDURE — 80053 COMPREHEN METABOLIC PANEL: CPT

## 2021-08-17 PROCEDURE — 93005 ELECTROCARDIOGRAM TRACING: CPT

## 2021-08-17 PROCEDURE — 85025 COMPLETE CBC W/AUTO DIFF WBC: CPT

## 2021-08-17 PROCEDURE — 96360 HYDRATION IV INFUSION INIT: CPT

## 2021-08-17 PROCEDURE — 84443 ASSAY THYROID STIM HORMONE: CPT | Performed by: PHYSICIAN ASSISTANT

## 2021-08-17 PROCEDURE — 96361 HYDRATE IV INFUSION ADD-ON: CPT

## 2021-08-17 PROCEDURE — 36415 COLL VENOUS BLD VENIPUNCTURE: CPT

## 2021-08-17 PROCEDURE — 25800000 HC PHARMACY IV SOLUTIONS: Performed by: PHYSICIAN ASSISTANT

## 2021-08-17 PROCEDURE — 84439 ASSAY OF FREE THYROXINE: CPT | Performed by: PHYSICIAN ASSISTANT

## 2021-08-17 PROCEDURE — 93005 ELECTROCARDIOGRAM TRACING: CPT | Performed by: EMERGENCY MEDICINE

## 2021-08-17 PROCEDURE — 71046 X-RAY EXAM CHEST 2 VIEWS: CPT

## 2021-08-17 PROCEDURE — 99284 EMERGENCY DEPT VISIT MOD MDM: CPT | Mod: 25

## 2021-08-17 RX ADMIN — SODIUM CHLORIDE 500 ML: 9 INJECTION, SOLUTION INTRAVENOUS at 20:54

## 2021-08-17 ASSESSMENT — ENCOUNTER SYMPTOMS
NAUSEA: 0
VOMITING: 0
FEVER: 0
CHILLS: 0
SHORTNESS OF BREATH: 0
ABDOMINAL PAIN: 0
COUGH: 0

## 2021-08-17 NOTE — ED PROVIDER NOTES
Emergency Medicine Note  HPI   HISTORY OF PRESENT ILLNESS     56-year-old female with history of CHF, CAD, MI, sleep apnea, CVA, type 2 diabetes, presents the emergency department for chest pain.  Patient reports on Sunday she started with right-sided chest pain, but only with palpation.  Seen at an urgent care, diagnosed with costochondritis.  Patient was referred to the ER at that time, but states she went home because she was too tired.  Patient decided to come in today, because now the pain has moved to the left side of her chest.  Denies nausea, vomiting, diaphoresis, shortness of breath, fever, chills, cough.  Patient is unsure what it felt like when she had her last MI, so unsure if the symptoms are similar.  Patient had a nuclear stress test done at [facility] in September 2020 that was unremarkable.  Patient also notes that she has been working with her endocrinologist because her thyroid function has been abnormal, and she has been feeling tired and depressed over this.  Patient has not had any recent travel, recent surgery, is not on OCP.            Patient History   PAST HISTORY     Reviewed from Nursing Triage: Tobacco  Allergies  Meds  Problems  Med Hx  Surg Hx  Fam Hx  Soc Hx        Past Medical History:   Diagnosis Date   • CHF (congestive heart failure) (CMS/HCC)    • Coronary artery disease    • Myocardial infarct (CMS/HCC)    • Sleep apnea    • Stroke (CMS/HCC)    • Type 2 diabetes mellitus (CMS/HCC)        History reviewed. No pertinent surgical history.    History reviewed. No pertinent family history.    Social History     Tobacco Use   • Smoking status: Never Smoker   • Smokeless tobacco: Never Used   Substance Use Topics   • Alcohol use: Never   • Drug use: Never         Review of Systems   REVIEW OF SYSTEMS     Review of Systems   Constitutional: Negative for chills and fever.   Respiratory: Negative for cough and shortness of breath.    Cardiovascular: Positive for chest  pain. Negative for leg swelling.   Gastrointestinal: Negative for abdominal pain, nausea and vomiting.         VITALS     ED Vitals    Date/Time Temp Pulse Resp BP SpO2 [facility]   08/17/21 2310 37 °C (98.6 °F) -- 18 130/70 97 % MLW   08/17/21 2056 -- 112 16 127/67 96 % BAM   08/17/21 1931 37 °C (98.6 °F) 112 16 128/77 98 % SKC   08/17/21 1833 36.7 °C (98 °F) 108 18 171/89 99 % OMB        Pulse Ox %: 99 % (08/17/21 1845)  Pulse Ox Interpretation: Normal (08/17/21 1845)  Heart Rate: 107 (08/17/21 1845)  Rhythm Strip Interpretation: Sinus Tachycardia (08/17/21 1845)     Physical Exam   PHYSICAL EXAM     Physical Exam  Vitals and nursing note reviewed.   Constitutional:       General: She is not in acute distress.     Appearance: Normal appearance. She is obese. She is not ill-appearing, toxic-appearing or diaphoretic.   HENT:      Head: Normocephalic and atraumatic.   Cardiovascular:      Rate and Rhythm: Regular rhythm. Tachycardia present.      Heart sounds: Normal heart sounds.   Pulmonary:      Effort: Pulmonary effort is normal. No respiratory distress.      Breath sounds: Normal breath sounds. No stridor. No wheezing, rhonchi or rales.   Chest:      Chest wall: Tenderness present.   Musculoskeletal:         General: Normal range of motion.      Right lower leg: No edema.      Left lower leg: No edema.   Skin:     General: Skin is warm and dry.   Neurological:      Mental Status: She is alert and oriented to person, place, and time.   Psychiatric:         Mood and Affect: Mood is depressed.           PROCEDURES     Procedures     DATA     Results     Procedure Component Value Units Date/Time    TSH w reflex FT4 [[id number]]  (Abnormal) Collected: 08/17/21 1839    Specimen: Blood, Venous Updated: 08/17/21 2204     TSH 0.07 mIU/L     B-type natriuretic peptide [[id number]]  (Normal) Collected: 08/17/21 1839    Specimen: Blood, Venous Updated: 08/17/21 2019     BNP 15 pg/mL     Troponin I [[id number]]  (Normal) Collected:  08/17/21 1839    Specimen: Blood, Venous Updated: 08/17/21 1934     Troponin I <0.02 ng/mL     Comprehensive metabolic panel [[id number]]  (Abnormal) Collected: 08/17/21 1839    Specimen: Blood, Venous Updated: 08/17/21 1917     Sodium 137 mEQ/L      Potassium 4.5 mEQ/L      Comment: Results obtained on plasma. Plasma Potassium values may be up to 0.4 mEQ/L less than serum values. The differences may be greater for patients with high platelet or white cell counts.  SLIGHT HEMOLYSIS, RESULT MAY BE INCREASED.        Chloride 102 mEQ/L      CO2 25 mEQ/L      BUN 17 mg/dL      Creatinine 0.8 mg/dL      Glucose 182 mg/dL      Calcium 9.2 mg/dL      AST (SGOT) 27 IU/L      Comment: SLIGHT HEMOLYSIS, RESULT MAY BE INCREASED.        ALT (SGPT) 36 IU/L      Comment: SLIGHT HEMOLYSIS, RESULT MAY BE INCREASED.        Alkaline Phosphatase 55 IU/L      Total Protein 7.7 g/dL      Comment: Test performed on plasma which typically contains approximately 0.4 g/dL more protein than serum.        Albumin 4.1 g/dL      Bilirubin, Total 0.6 mg/dL      Comment: SLIGHT HEMOLYSIS, RESULT MAY BE INCREASED.        eGFR >60.0 mL/min/1.73m*2      Anion Gap 10 mEQ/L     CBC and differential [[id number]]  (Abnormal) Collected: 08/17/21 1839    Specimen: Blood, Venous Updated: 08/17/21 1901     WBC 8.03 K/uL      RBC 4.43 M/uL      Hemoglobin 13.2 g/dL      Hematocrit 40.5 %      MCV 91.4 fL      MCH 29.8 pg      MCHC 32.6 g/dL      RDW 12.1 %      Platelets 277 K/uL      MPV 10.4 fL      Differential Type Auto     nRBC 0.0 %      Immature Granulocytes 0.4 %      Neutrophils 53.8 %      Lymphocytes 32.1 %      Monocytes 8.0 %      Eosinophils 5.0 %      Basophils 0.7 %      Immature Granulocytes, Absolute 0.03 K/uL      Neutrophils, Absolute 4.32 K/uL      Lymphocytes, Absolute 2.58 K/uL      Monocytes, Absolute 0.64 K/uL      Eosinophils, Absolute 0.40 K/uL      Basophils, Absolute 0.06 K/uL     RAINBOW RED [[id number]] Collected: 08/17/21 1839     Specimen: Blood, Venous Updated: 08/17/21 1858    RAINBOW GOLD [[id number]] Collected: 08/17/21 1840    Specimen: Blood, Venous Updated: 08/17/21 1858    [place] Draw Panel [[id number]] Collected: 08/17/21 1839    Specimen: Blood, Venous Updated: 08/17/21 1858    Narrative:      The following orders were created for panel order [place] Draw Panel.  Procedure                               Abnormality         Status                     ---------                               -----------         ------                     RAINBOW RED[[id number]]                                      In process                 RAINBOW LT BLUE[[id number]]                                  In process                 RAINBOW GOLD[[id number]]                                     In process                   Please view results for these tests on the individual orders.    RAINBOW LT BLUE [[id number]] Collected: 08/17/21 1840    Specimen: Blood, Venous Updated: 08/17/21 1858          Imaging Results          X-RAY CHEST 2 VIEWS (Final result)  Result time 08/17/21 19:39:09    Final result                 Impression:    IMPRESSION: No acute disease in the chest.               Narrative:    CLINICAL HISTORY:  Chest wall pain   .    COMMENT: Frontal erect PA and lateral views of the chest obtained 8/17/2021.    COMPARISON: Chest radiograph from 5/9/2021    There are low lung volumes.  The lung fields are clear. No pleural effusion or  pneumothorax.  The cardiomediastinal contours are within normal limits.  The  pulmonary vasculature is within normal limits.    No abnormalities are noted in the upper abdomen.  There is no acute osseous  abnormality.                                ECG 12 lead   ED Interpretation   Sinus [id number] bpmPRI within normal limitsQRS within normal limitsNormal axisNo ST elevation or depressionReviewed by attending and myself          Scoring tools                                 ED Course & MDM   MDM / ED COURSE and CLINICAL  IMPRESSIONS     MDM    ED Course as of Aug 18 0006   Tue Aug 17, 2021   1930 Impression: Chest painPlan: Labs, EKG, chest x-ray, observe    [MG]   1930 WNL   Troponin I: <0.02 [MG]   2037 Low   BNP: 15 [MG]   2038 IMPRESSION: No acute disease in the chest.   X-RAY CHEST 2 VIEWS [MG]   2047 Called lab about pending add-on status of TSH -they will take care of it    [MG]   2205 Low - likely the cause of patient's tachycardia.  Pt reports it has been low and she is working with her endocrine doctor   TSH(!): 0.07 [MG]   2229 Case d/w Dr Warren who also evaluated the pt    [MG]      ED Course User Index  [MG] Judi Manning PA C         Clinical Impressions as of Aug 18 0006   Chest wall pain            Judi Manning PA C  08/18/21 0007

## 2021-08-18 LAB
ATRIAL RATE: 107
P AXIS: 56
PR INTERVAL: 164
QRS DURATION: 72
QT INTERVAL: 336
QTC CALCULATION(BAZETT): 448
R AXIS: 13
T WAVE AXIS: 23
VENTRICULAR RATE: 107

## 2021-08-18 NOTE — ED ATTESTATION NOTE
Procedures  Physical Exam  Review of Systems    8/17/202110:23 PM  I have personally seen and examined the patient.  I reviewed and agree with the PA/NP/Resident's assessment and plan of care.    My examination, assessment, and plan of care of Laurence Chang is as follows:  The patient presents with 56-year-old female with history of type 2 diabetes, coronary artery disease with CHF and MI, CVA, sleep apnea etc. The patient also has hypothyroidism. She is on both Synthroid and [place] Thyroid. She presents to the emergency department with soreness in her anterior chest unlike her previous MI. She states the pain is on the sides. She has not been short of breath and denies any pleuritic pain. She is not on thinners, she is on 81 mg of aspirin. She went to an urgent care over the past 2 days with pain over her right anterior chest and was diagnosed with costochondritis. Her pain is now over the left anterior chest as well.  Exam: Examination reveals the patient to be alert in no acute distress. She is tachycardic in the 120s. Her heart is regular without murmur. Her lungs are clear bilaterally. There is some tenderness to palpation over the anterior chest but not necessarily reproducing her chief complaint.  Impression/Plan: The patient has been evaluated with lab work, chest x-ray and EKG. Her TSH is decreased at 0.07. Troponin is normal at less than 0.02. Blood sugar is 182. The patient will be discharged home to follow-up with her own family doctor.    Vital Sign Review: Vital signs have been ordered and reviewed. The oxygen saturation is 99% on room air, normal.     I was physically present for the key/critical portions of the following procedures: None    This document was created using dragon dictation software.  There might be some typographical errors due to this technology.     Souleymane Warren, DO  08/17/21 1638

## 2021-10-21 ENCOUNTER — APPOINTMENT (RX ONLY)
Dept: URBAN - METROPOLITAN AREA CLINIC 374 | Facility: CLINIC | Age: 56
Setting detail: DERMATOLOGY
End: 2021-10-21

## 2021-10-21 DIAGNOSIS — Z71.89 OTHER SPECIFIED COUNSELING: ICD-10-CM

## 2021-10-21 DIAGNOSIS — D17 BENIGN LIPOMATOUS NEOPLASM: ICD-10-CM

## 2021-10-21 DIAGNOSIS — L30.8 OTHER SPECIFIED DERMATITIS: ICD-10-CM | Status: INADEQUATELY CONTROLLED

## 2021-10-21 DIAGNOSIS — L30.4 ERYTHEMA INTERTRIGO: ICD-10-CM | Status: INADEQUATELY CONTROLLED

## 2021-10-21 DIAGNOSIS — L81.4 OTHER MELANIN HYPERPIGMENTATION: ICD-10-CM

## 2021-10-21 DIAGNOSIS — Z85.828 PERSONAL HISTORY OF OTHER MALIGNANT NEOPLASM OF SKIN: ICD-10-CM

## 2021-10-21 DIAGNOSIS — L82.1 OTHER SEBORRHEIC KERATOSIS: ICD-10-CM

## 2021-10-21 DIAGNOSIS — D18.0 HEMANGIOMA: ICD-10-CM

## 2021-10-21 DIAGNOSIS — D22 MELANOCYTIC NEVI: ICD-10-CM

## 2021-10-21 PROBLEM — D18.01 HEMANGIOMA OF SKIN AND SUBCUTANEOUS TISSUE: Status: ACTIVE | Noted: 2021-10-21

## 2021-10-21 PROBLEM — D22.5 MELANOCYTIC NEVI OF TRUNK: Status: ACTIVE | Noted: 2021-10-21

## 2021-10-21 PROBLEM — D22.72 MELANOCYTIC NEVI OF LEFT LOWER LIMB, INCLUDING HIP: Status: ACTIVE | Noted: 2021-10-21

## 2021-10-21 PROBLEM — D48.5 NEOPLASM OF UNCERTAIN BEHAVIOR OF SKIN: Status: ACTIVE | Noted: 2021-10-21

## 2021-10-21 PROBLEM — D22.62 MELANOCYTIC NEVI OF LEFT UPPER LIMB, INCLUDING SHOULDER: Status: ACTIVE | Noted: 2021-10-21

## 2021-10-21 PROBLEM — D22.71 MELANOCYTIC NEVI OF RIGHT LOWER LIMB, INCLUDING HIP: Status: ACTIVE | Noted: 2021-10-21

## 2021-10-21 PROBLEM — D22.61 MELANOCYTIC NEVI OF RIGHT UPPER LIMB, INCLUDING SHOULDER: Status: ACTIVE | Noted: 2021-10-21

## 2021-10-21 PROCEDURE — ? TREATMENT REGIMEN

## 2021-10-21 PROCEDURE — 99213 OFFICE O/P EST LOW 20 MIN: CPT

## 2021-10-21 PROCEDURE — ? PRESCRIPTION

## 2021-10-21 PROCEDURE — ? PHOTO-DOCUMENTATION

## 2021-10-21 PROCEDURE — ? FULL BODY SKIN EXAM

## 2021-10-21 PROCEDURE — ? PRESCRIPTION MEDICATION MANAGEMENT

## 2021-10-21 PROCEDURE — ? COUNSELING

## 2021-10-21 PROCEDURE — ? SUNSCREEN RECOMMENDATIONS

## 2021-10-21 RX ORDER — KETOCONAZOLE 20 MG/G
1 CREAM TOPICAL BID
Qty: 60 | Refills: 1 | Status: ERX | COMMUNITY
Start: 2021-10-21

## 2021-10-21 RX ORDER — SILVER SULFADIAZINE 10 MG/G
1 CREAM TOPICAL QDAY
Qty: 50 | Refills: 3 | Status: ERX | COMMUNITY
Start: 2021-10-21

## 2021-10-21 RX ADMIN — SILVER SULFADIAZINE 1: 10 CREAM TOPICAL at 00:00

## 2021-10-21 RX ADMIN — KETOCONAZOLE 1: 20 CREAM TOPICAL at 00:00

## 2021-10-21 ASSESSMENT — LOCATION SIMPLE DESCRIPTION DERM
LOCATION SIMPLE: CHEST
LOCATION SIMPLE: RIGHT PRETIBIAL REGION
LOCATION SIMPLE: RIGHT UPPER BACK
LOCATION SIMPLE: RIGHT THIGH
LOCATION SIMPLE: LEFT UPPER BACK
LOCATION SIMPLE: UPPER BACK
LOCATION SIMPLE: LEFT UPPER ARM
LOCATION SIMPLE: LEFT FOREARM
LOCATION SIMPLE: RIGHT FOREARM
LOCATION SIMPLE: ABDOMEN
LOCATION SIMPLE: RIGHT UPPER ARM
LOCATION SIMPLE: LEFT THIGH
LOCATION SIMPLE: LEFT PRETIBIAL REGION

## 2021-10-21 ASSESSMENT — LOCATION DETAILED DESCRIPTION DERM
LOCATION DETAILED: LEFT SUPERIOR UPPER BACK
LOCATION DETAILED: RIGHT SUPERIOR MEDIAL UPPER BACK
LOCATION DETAILED: LEFT DISTAL DORSAL FOREARM
LOCATION DETAILED: INFERIOR THORACIC SPINE
LOCATION DETAILED: LEFT LATERAL ABDOMEN
LOCATION DETAILED: RIGHT ANTERIOR PROXIMAL UPPER ARM
LOCATION DETAILED: RIGHT PROXIMAL PRETIBIAL REGION
LOCATION DETAILED: RIGHT ANTERIOR DISTAL UPPER ARM
LOCATION DETAILED: LEFT ANTERIOR PROXIMAL THIGH
LOCATION DETAILED: LEFT ANTECUBITAL SKIN
LOCATION DETAILED: LEFT ANTERIOR PROXIMAL UPPER ARM
LOCATION DETAILED: LEFT ANTERIOR DISTAL THIGH
LOCATION DETAILED: EPIGASTRIC SKIN
LOCATION DETAILED: LEFT DISTAL PRETIBIAL REGION
LOCATION DETAILED: RIGHT MEDIAL SUPERIOR CHEST
LOCATION DETAILED: RIGHT PROXIMAL RADIAL DORSAL FOREARM
LOCATION DETAILED: RIGHT INFERIOR MEDIAL UPPER BACK
LOCATION DETAILED: RIGHT LATERAL ABDOMEN
LOCATION DETAILED: RIGHT ANTERIOR DISTAL THIGH

## 2021-10-21 ASSESSMENT — LOCATION ZONE DERM
LOCATION ZONE: LEG
LOCATION ZONE: TRUNK
LOCATION ZONE: ARM

## 2021-10-21 NOTE — PROCEDURE: TREATMENT REGIMEN
Detail Level: Simple
Plan: Get copy of records including any ultrasound reports for lesion of left arm that were done at [facility].  T/C referral to general surgery for further evaluation and removal of lesion due to location at antecubital fossa.  Discussed with patient that I prefer she see surgeon because this location of the body contains several nerves and makes this surgery a little more complicated and risky to due in our office

## 2021-10-21 NOTE — PROCEDURE: PRESCRIPTION MEDICATION MANAGEMENT
Plan: D/w patient to keep nails trimmed short and try to use cotton gloves when sleeping so if she scratches unknowingly at night, she can help avoid further excoriations
Detail Level: Zone
Discontinue Regimen: Stop picking!
Initiate Treatment: Silvadene cream, apply QDay to sores
Render In Strict Bullet Format?: No
Initiate Treatment: Ketoconazole 2% shampoo: apply bid to lower abdomen prn flare\\nSilvadene cream QDay to sores on lower abdomen
Discontinue Regimen: Cornstarch

## 2022-02-10 ENCOUNTER — APPOINTMENT (RX ONLY)
Dept: URBAN - METROPOLITAN AREA CLINIC 374 | Facility: CLINIC | Age: 57
Setting detail: DERMATOLOGY
End: 2022-02-10

## 2022-02-10 ENCOUNTER — RX ONLY (OUTPATIENT)
Age: 57
Setting detail: RX ONLY
End: 2022-02-10

## 2022-02-10 DIAGNOSIS — L21.8 OTHER SEBORRHEIC DERMATITIS: ICD-10-CM | Status: INADEQUATELY CONTROLLED

## 2022-02-10 DIAGNOSIS — L98429 CHRONIC ULCER OF OTHER SPECIFIED SITES: ICD-10-CM

## 2022-02-10 DIAGNOSIS — L98419 CHRONIC ULCER OF OTHER SPECIFIED SITES: ICD-10-CM

## 2022-02-10 PROBLEM — L98.429 NON-PRESSURE CHRONIC ULCER OF BACK WITH UNSPECIFIED SEVERITY: Status: ACTIVE | Noted: 2022-02-10

## 2022-02-10 PROCEDURE — ? ADDITIONAL NOTES

## 2022-02-10 PROCEDURE — ? PHOTO-DOCUMENTATION

## 2022-02-10 PROCEDURE — ? DEFER

## 2022-02-10 PROCEDURE — ? PRESCRIPTION MEDICATION MANAGEMENT

## 2022-02-10 PROCEDURE — ? COUNSELING

## 2022-02-10 PROCEDURE — 99214 OFFICE O/P EST MOD 30 MIN: CPT

## 2022-02-10 PROCEDURE — ? PRESCRIPTION

## 2022-02-10 RX ORDER — HYDROCORTISONE 25 MG/ML
1 LOTION TOPICAL QHS
Qty: 59 | Refills: 3 | Status: CANCELLED | COMMUNITY
Start: 2022-02-10

## 2022-02-10 RX ORDER — HYDROCORTISONE 25 MG/ML
1 LOTION TOPICAL QHS
Qty: 59 | Refills: 2 | Status: ERX | COMMUNITY
Start: 2022-02-10

## 2022-02-10 RX ADMIN — HYDROCORTISONE 1: 25 LOTION TOPICAL at 00:00

## 2022-02-10 ASSESSMENT — LOCATION SIMPLE DESCRIPTION DERM
LOCATION SIMPLE: RIGHT SCALP
LOCATION SIMPLE: RIGHT UPPER BACK
LOCATION SIMPLE: SCALP
LOCATION SIMPLE: LEFT FOREHEAD

## 2022-02-10 ASSESSMENT — LOCATION DETAILED DESCRIPTION DERM
LOCATION DETAILED: LEFT SUPERIOR PARIETAL SCALP
LOCATION DETAILED: RIGHT MEDIAL FRONTAL SCALP
LOCATION DETAILED: LEFT SUPERIOR FOREHEAD
LOCATION DETAILED: RIGHT INFERIOR MEDIAL UPPER BACK

## 2022-02-10 ASSESSMENT — LOCATION ZONE DERM
LOCATION ZONE: SCALP
LOCATION ZONE: FACE
LOCATION ZONE: TRUNK

## 2022-02-10 NOTE — PROCEDURE: PRESCRIPTION MEDICATION MANAGEMENT
Initiate Treatment: hydrocortisone 2.5 % lotion: Apply to AA of scalp QHS every other day
Detail Level: Generalized
Render In Strict Bullet Format?: No
Initiate Treatment: Silvadene 1% topical cream: Apply pea size amount to AA of back BID

## 2022-03-02 ENCOUNTER — APPOINTMENT (RX ONLY)
Dept: URBAN - METROPOLITAN AREA CLINIC 374 | Facility: CLINIC | Age: 57
Setting detail: DERMATOLOGY
End: 2022-03-02

## 2022-03-02 DIAGNOSIS — L98419 CHRONIC ULCER OF OTHER SPECIFIED SITES: ICD-10-CM | Status: UNCHANGED

## 2022-03-02 DIAGNOSIS — L98429 CHRONIC ULCER OF OTHER SPECIFIED SITES: ICD-10-CM | Status: UNCHANGED

## 2022-03-02 DIAGNOSIS — L21.8 OTHER SEBORRHEIC DERMATITIS: ICD-10-CM | Status: STABLE

## 2022-03-02 PROBLEM — L98.429 NON-PRESSURE CHRONIC ULCER OF BACK WITH UNSPECIFIED SEVERITY: Status: ACTIVE | Noted: 2022-03-02

## 2022-03-02 PROCEDURE — ? PRESCRIPTION MEDICATION MANAGEMENT

## 2022-03-02 PROCEDURE — ? RECOMMENDATIONS

## 2022-03-02 PROCEDURE — ? ADDITIONAL NOTES

## 2022-03-02 PROCEDURE — ? PRESCRIPTION

## 2022-03-02 PROCEDURE — 99213 OFFICE O/P EST LOW 20 MIN: CPT

## 2022-03-02 PROCEDURE — ? PHOTO-DOCUMENTATION

## 2022-03-02 PROCEDURE — ? COUNSELING

## 2022-03-02 PROCEDURE — ? DEFER

## 2022-03-02 RX ORDER — TRIAMCINOLONE ACETONIDE 1 MG/G
1 CREAM TOPICAL BID
Qty: 15 | Refills: 0 | Status: ERX | COMMUNITY
Start: 2022-03-02

## 2022-03-02 RX ADMIN — TRIAMCINOLONE ACETONIDE 1: 1 CREAM TOPICAL at 00:00

## 2022-03-02 ASSESSMENT — LOCATION ZONE DERM
LOCATION ZONE: FACE
LOCATION ZONE: SCALP
LOCATION ZONE: TRUNK

## 2022-03-02 ASSESSMENT — LOCATION SIMPLE DESCRIPTION DERM
LOCATION SIMPLE: LEFT FOREHEAD
LOCATION SIMPLE: RIGHT UPPER BACK
LOCATION SIMPLE: RIGHT SCALP
LOCATION SIMPLE: SCALP

## 2022-03-02 ASSESSMENT — LOCATION DETAILED DESCRIPTION DERM
LOCATION DETAILED: RIGHT MEDIAL FRONTAL SCALP
LOCATION DETAILED: LEFT SUPERIOR PARIETAL SCALP
LOCATION DETAILED: RIGHT INFERIOR MEDIAL UPPER BACK
LOCATION DETAILED: LEFT SUPERIOR FOREHEAD

## 2022-03-02 NOTE — PROCEDURE: PRESCRIPTION MEDICATION MANAGEMENT
Continue Regimen: hydrocortisone 2.5 % lotion: Apply to AA of scalp QHS every other day PRN flare
Detail Level: Generalized
Render In Strict Bullet Format?: No
Initiate Treatment: triamcinolone acetonide 0.1 % topical cream: Apply a thin layer to aa on back bid
Continue Regimen: Silvadene 1% topical cream: Apply pea size amount to AA of back BID

## 2022-03-02 NOTE — PROCEDURE: RECOMMENDATIONS
Render Risk Assessment In Note?: no
Detail Level: Simple
Recommendations (Free Text): CUT NAILS AND STOP PICKING!
Recommendation Preamble: The following recommendations were made during the visit:

## 2022-05-12 ENCOUNTER — APPOINTMENT (RX ONLY)
Dept: URBAN - METROPOLITAN AREA CLINIC 374 | Facility: CLINIC | Age: 57
Setting detail: DERMATOLOGY
End: 2022-05-12

## 2022-05-12 DIAGNOSIS — L98419 CHRONIC ULCER OF OTHER SPECIFIED SITES: ICD-10-CM | Status: RESOLVED

## 2022-05-12 DIAGNOSIS — L98429 CHRONIC ULCER OF OTHER SPECIFIED SITES: ICD-10-CM | Status: RESOLVED

## 2022-05-12 DIAGNOSIS — L21.8 OTHER SEBORRHEIC DERMATITIS: ICD-10-CM | Status: STABLE

## 2022-05-12 DIAGNOSIS — L57.0 ACTINIC KERATOSIS: ICD-10-CM

## 2022-05-12 PROBLEM — L98.429 NON-PRESSURE CHRONIC ULCER OF BACK WITH UNSPECIFIED SEVERITY: Status: ACTIVE | Noted: 2022-05-12

## 2022-05-12 PROCEDURE — ? PHOTO-DOCUMENTATION

## 2022-05-12 PROCEDURE — ? COUNSELING

## 2022-05-12 PROCEDURE — ? PREMALIGNANT DESTRUCTION

## 2022-05-12 PROCEDURE — 17000 DESTRUCT PREMALG LESION: CPT

## 2022-05-12 PROCEDURE — ? PRESCRIPTION MEDICATION MANAGEMENT

## 2022-05-12 PROCEDURE — 17003 DESTRUCT PREMALG LES 2-14: CPT

## 2022-05-12 PROCEDURE — 99213 OFFICE O/P EST LOW 20 MIN: CPT | Mod: 25

## 2022-05-12 ASSESSMENT — LOCATION DETAILED DESCRIPTION DERM
LOCATION DETAILED: RIGHT INFERIOR LATERAL FOREHEAD
LOCATION DETAILED: LEFT MID TEMPLE
LOCATION DETAILED: LEFT SUPERIOR PARIETAL SCALP
LOCATION DETAILED: RIGHT MEDIAL FRONTAL SCALP
LOCATION DETAILED: LEFT INFERIOR LATERAL FOREHEAD
LOCATION DETAILED: LEFT SUPERIOR FOREHEAD
LOCATION DETAILED: RIGHT FOREHEAD
LOCATION DETAILED: RIGHT INFERIOR MEDIAL UPPER BACK

## 2022-05-12 ASSESSMENT — LOCATION SIMPLE DESCRIPTION DERM
LOCATION SIMPLE: LEFT FOREHEAD
LOCATION SIMPLE: RIGHT UPPER BACK
LOCATION SIMPLE: SCALP
LOCATION SIMPLE: RIGHT SCALP
LOCATION SIMPLE: LEFT TEMPLE
LOCATION SIMPLE: RIGHT FOREHEAD

## 2022-05-12 ASSESSMENT — LOCATION ZONE DERM
LOCATION ZONE: SCALP
LOCATION ZONE: TRUNK
LOCATION ZONE: FACE

## 2022-05-12 NOTE — PROCEDURE: PRESCRIPTION MEDICATION MANAGEMENT
Continue Regimen: hydrocortisone 2.5% lotion: Apply to AA of scalp QHS every other day PRN flare
Detail Level: Zone
Render In Strict Bullet Format?: No
Discontinue Regimen: triamcinolone acetonide 0.1 % topical cream: Apply a thin layer to aa on back bid\\nSilvadene 1% topical cream: Apply pea size amount to AA of back BID

## 2022-05-12 NOTE — PROCEDURE: PREMALIGNANT DESTRUCTION
Consent: The patient's consent was obtained including but not limited to risks of crusting, scabbing, blistering, scarring, darker or lighter pigmentary change, recurrence, incomplete removal and infection.
Detail Level: Zone
Anesthesia Volume In Cc: 0.5
Method: liquid nitrogen
Post-Care Instructions: I reviewed with the patient in detail post-care instructions. Patient is to wear sunprotection, and avoid picking at any of the treated lesions. Pt may apply Vaseline to crusted or scabbing areas.
Render Post-Care In The Note: No

## 2022-11-10 ENCOUNTER — APPOINTMENT (RX ONLY)
Dept: URBAN - METROPOLITAN AREA CLINIC 374 | Facility: CLINIC | Age: 57
Setting detail: DERMATOLOGY
End: 2022-11-10

## 2022-11-10 DIAGNOSIS — L98429 CHRONIC ULCER OF OTHER SPECIFIED SITES: ICD-10-CM | Status: WORSENING

## 2022-11-10 DIAGNOSIS — L98419 CHRONIC ULCER OF OTHER SPECIFIED SITES: ICD-10-CM | Status: WORSENING

## 2022-11-10 PROBLEM — L97.829 NON-PRESSURE CHRONIC ULCER OF OTHER PART OF LEFT LOWER LEG WITH UNSPECIFIED SEVERITY: Status: ACTIVE | Noted: 2022-11-10

## 2022-11-10 PROCEDURE — ? PRESCRIPTION

## 2022-11-10 PROCEDURE — 99213 OFFICE O/P EST LOW 20 MIN: CPT

## 2022-11-10 PROCEDURE — ? PHOTO-DOCUMENTATION

## 2022-11-10 PROCEDURE — ? COUNSELING

## 2022-11-10 PROCEDURE — ? PRESCRIPTION MEDICATION MANAGEMENT

## 2022-11-10 RX ORDER — SILVER SULFADIAZINE 10 MG/G
1 CREAM TOPICAL BID
Qty: 50 | Refills: 1 | Status: ERX

## 2022-11-10 RX ORDER — AMOXICILLIN 500 MG/1
1 TABLET, FILM COATED ORAL
Qty: 20 | Refills: 0 | Status: ERX | COMMUNITY
Start: 2022-11-10

## 2022-11-10 RX ADMIN — AMOXICILLIN 1: 500 TABLET, FILM COATED ORAL at 00:00

## 2022-11-10 ASSESSMENT — LOCATION SIMPLE DESCRIPTION DERM: LOCATION SIMPLE: LEFT PRETIBIAL REGION

## 2022-11-10 ASSESSMENT — LOCATION ZONE DERM: LOCATION ZONE: LEG

## 2022-11-10 ASSESSMENT — LOCATION DETAILED DESCRIPTION DERM: LOCATION DETAILED: LEFT DISTAL PRETIBIAL REGION

## 2022-11-10 NOTE — PROCEDURE: PRESCRIPTION MEDICATION MANAGEMENT
Render In Strict Bullet Format?: No
Samples Given: Strata grt
Detail Level: Simple
Initiate Treatment: amoxicillin 500 mg tablet: Take one tab po bid with food x10 days\\nSilvadene 1 % topical cream: Apply a thin layer to aa of leg bid

## 2022-11-16 ENCOUNTER — APPOINTMENT (RX ONLY)
Dept: URBAN - METROPOLITAN AREA CLINIC 374 | Facility: CLINIC | Age: 57
Setting detail: DERMATOLOGY
End: 2022-11-16

## 2022-11-16 DIAGNOSIS — L30.8 OTHER SPECIFIED DERMATITIS: ICD-10-CM

## 2022-11-16 DIAGNOSIS — L81.4 OTHER MELANIN HYPERPIGMENTATION: ICD-10-CM

## 2022-11-16 DIAGNOSIS — Z71.89 OTHER SPECIFIED COUNSELING: ICD-10-CM

## 2022-11-16 DIAGNOSIS — L98419 CHRONIC ULCER OF OTHER SPECIFIED SITES: ICD-10-CM | Status: IMPROVED

## 2022-11-16 DIAGNOSIS — L98429 CHRONIC ULCER OF OTHER SPECIFIED SITES: ICD-10-CM | Status: IMPROVED

## 2022-11-16 DIAGNOSIS — D22 MELANOCYTIC NEVI: ICD-10-CM

## 2022-11-16 PROBLEM — L97.829 NON-PRESSURE CHRONIC ULCER OF OTHER PART OF LEFT LOWER LEG WITH UNSPECIFIED SEVERITY: Status: ACTIVE | Noted: 2022-11-16

## 2022-11-16 PROBLEM — D22.71 MELANOCYTIC NEVI OF RIGHT LOWER LIMB, INCLUDING HIP: Status: ACTIVE | Noted: 2022-11-16

## 2022-11-16 PROBLEM — D22.62 MELANOCYTIC NEVI OF LEFT UPPER LIMB, INCLUDING SHOULDER: Status: ACTIVE | Noted: 2022-11-16

## 2022-11-16 PROBLEM — D22.5 MELANOCYTIC NEVI OF TRUNK: Status: ACTIVE | Noted: 2022-11-16

## 2022-11-16 PROBLEM — D22.72 MELANOCYTIC NEVI OF LEFT LOWER LIMB, INCLUDING HIP: Status: ACTIVE | Noted: 2022-11-16

## 2022-11-16 PROBLEM — D22.61 MELANOCYTIC NEVI OF RIGHT UPPER LIMB, INCLUDING SHOULDER: Status: ACTIVE | Noted: 2022-11-16

## 2022-11-16 PROCEDURE — ? FULL BODY SKIN EXAM

## 2022-11-16 PROCEDURE — ? PRESCRIPTION SAMPLES PROVIDED

## 2022-11-16 PROCEDURE — 99213 OFFICE O/P EST LOW 20 MIN: CPT

## 2022-11-16 PROCEDURE — ? PRESCRIPTION MEDICATION MANAGEMENT

## 2022-11-16 PROCEDURE — ? SUNSCREEN RECOMMENDATIONS

## 2022-11-16 PROCEDURE — ? COUNSELING

## 2022-11-16 PROCEDURE — ? RECOMMENDATIONS

## 2022-11-16 ASSESSMENT — LOCATION DETAILED DESCRIPTION DERM
LOCATION DETAILED: INFERIOR THORACIC SPINE
LOCATION DETAILED: LEFT SUPERIOR MEDIAL UPPER BACK
LOCATION DETAILED: RIGHT ANTERIOR PROXIMAL UPPER ARM
LOCATION DETAILED: LEFT ANTERIOR PROXIMAL UPPER ARM
LOCATION DETAILED: LEFT ANTERIOR DISTAL THIGH
LOCATION DETAILED: RIGHT ANTERIOR DISTAL THIGH
LOCATION DETAILED: LEFT DISTAL PRETIBIAL REGION
LOCATION DETAILED: RIGHT SUPERIOR MEDIAL UPPER BACK
LOCATION DETAILED: UPPER STERNUM
LOCATION DETAILED: RIGHT MEDIAL SUPERIOR CHEST
LOCATION DETAILED: RIGHT SUPERIOR MEDIAL MIDBACK

## 2022-11-16 ASSESSMENT — LOCATION SIMPLE DESCRIPTION DERM
LOCATION SIMPLE: LEFT THIGH
LOCATION SIMPLE: RIGHT THIGH
LOCATION SIMPLE: LEFT UPPER ARM
LOCATION SIMPLE: RIGHT UPPER BACK
LOCATION SIMPLE: RIGHT LOWER BACK
LOCATION SIMPLE: LEFT UPPER BACK
LOCATION SIMPLE: CHEST
LOCATION SIMPLE: UPPER BACK
LOCATION SIMPLE: LEFT PRETIBIAL REGION
LOCATION SIMPLE: RIGHT UPPER ARM

## 2022-11-16 ASSESSMENT — LOCATION ZONE DERM
LOCATION ZONE: LEG
LOCATION ZONE: ARM
LOCATION ZONE: TRUNK

## 2022-11-16 NOTE — PROCEDURE: PRESCRIPTION MEDICATION MANAGEMENT
Render In Strict Bullet Format?: No
Discontinue Regimen: Silvadene 1 % topical cream: Apply a thin layer to aa of leg bid\\namoxicillin 500 mg tablet: Take one tab po bid with food x10 days
Continue Regimen: wound cream sample qday
Detail Level: Simple
Initiate Treatment: doryx 80mg tablet: take 1 tab po qday x 9 days

## 2022-11-16 NOTE — PROCEDURE: RECOMMENDATIONS
Recommendations (Free Text): Stop picking
Detail Level: Zone
Render Risk Assessment In Note?: no
Recommendation Preamble: The following recommendations were made during the visit:

## 2023-02-11 ENCOUNTER — HOSPITAL ENCOUNTER (EMERGENCY)
Facility: HOSPITAL | Age: 58
Discharge: HOME | End: 2023-02-11
Attending: EMERGENCY MEDICINE
Payer: MEDICARE

## 2023-02-11 ENCOUNTER — APPOINTMENT (EMERGENCY)
Dept: RADIOLOGY | Facility: HOSPITAL | Age: 58
End: 2023-02-11
Payer: MEDICARE

## 2023-02-11 VITALS
TEMPERATURE: 97.9 F | RESPIRATION RATE: 18 BRPM | HEART RATE: 90 BPM | SYSTOLIC BLOOD PRESSURE: 138 MMHG | DIASTOLIC BLOOD PRESSURE: 76 MMHG | OXYGEN SATURATION: 98 %

## 2023-02-11 DIAGNOSIS — R00.0 SINUS TACHYCARDIA: ICD-10-CM

## 2023-02-11 DIAGNOSIS — M54.9 LEFT-SIDED BACK PAIN, UNSPECIFIED BACK LOCATION, UNSPECIFIED CHRONICITY: Primary | ICD-10-CM

## 2023-02-11 LAB
ALBUMIN SERPL-MCNC: 4.1 G/DL (ref 3.4–5)
ALP SERPL-CCNC: 59 IU/L (ref 35–126)
ALT SERPL-CCNC: 43 IU/L (ref 11–54)
ANION GAP SERPL CALC-SCNC: 5 MEQ/L (ref 3–15)
AST SERPL-CCNC: 35 IU/L (ref 15–41)
BASOPHILS # BLD: 0.05 K/UL (ref 0.01–0.1)
BASOPHILS NFR BLD: 0.7 %
BILIRUB SERPL-MCNC: 0.8 MG/DL (ref 0.3–1.2)
BUN SERPL-MCNC: 14 MG/DL (ref 8–20)
CALCIUM SERPL-MCNC: 9.3 MG/DL (ref 8.9–10.3)
CHLORIDE SERPL-SCNC: 104 MEQ/L (ref 98–109)
CO2 SERPL-SCNC: 29 MEQ/L (ref 22–32)
CREAT SERPL-MCNC: 1 MG/DL (ref 0.6–1.1)
D DIMER PPP IA.FEU-MCNC: 0.41 UG/ML FEU (ref 0–0.5)
DIFFERENTIAL METHOD BLD: NORMAL
EOSINOPHIL # BLD: 0.24 K/UL (ref 0.04–0.36)
EOSINOPHIL NFR BLD: 3.2 %
ERYTHROCYTE [DISTWIDTH] IN BLOOD BY AUTOMATED COUNT: 12.3 % (ref 11.7–14.4)
GFR SERPL CREATININE-BSD FRML MDRD: 57.4 ML/MIN/1.73M*2
GLUCOSE SERPL-MCNC: 91 MG/DL (ref 70–99)
HCT VFR BLDCO AUTO: 41.8 % (ref 35–45)
HGB BLD-MCNC: 14 G/DL (ref 11.8–15.7)
IMM GRANULOCYTES # BLD AUTO: 0.02 K/UL (ref 0–0.08)
IMM GRANULOCYTES NFR BLD AUTO: 0.3 %
LYMPHOCYTES # BLD: 2.34 K/UL (ref 1.2–3.5)
LYMPHOCYTES NFR BLD: 31.4 %
MCH RBC QN AUTO: 30.4 PG (ref 28–33.2)
MCHC RBC AUTO-ENTMCNC: 33.5 G/DL (ref 32.2–35.5)
MCV RBC AUTO: 90.7 FL (ref 83–98)
MONOCYTES # BLD: 0.61 K/UL (ref 0.28–0.8)
MONOCYTES NFR BLD: 8.2 %
NEUTROPHILS # BLD: 4.2 K/UL (ref 1.7–7)
NEUTS SEG NFR BLD: 56.2 %
NRBC BLD-RTO: 0 %
PDW BLD AUTO: 9.7 FL (ref 9.4–12.3)
PLATELET # BLD AUTO: 288 K/UL (ref 150–369)
POTASSIUM SERPL-SCNC: 4.3 MEQ/L (ref 3.6–5.1)
PROT SERPL-MCNC: 7.7 G/DL (ref 6–8.2)
RBC # BLD AUTO: 4.61 M/UL (ref 3.93–5.22)
SODIUM SERPL-SCNC: 138 MEQ/L (ref 136–144)
T4 FREE SERPL-MCNC: 0.92 NG/DL (ref 0.58–1.64)
TROPONIN I SERPL HS-MCNC: 4.5 PG/ML
TSH SERPL DL<=0.05 MIU/L-ACNC: 0.08 MIU/L (ref 0.34–5.6)
WBC # BLD AUTO: 7.46 K/UL (ref 3.8–10.5)

## 2023-02-11 PROCEDURE — 84484 ASSAY OF TROPONIN QUANT: CPT

## 2023-02-11 PROCEDURE — 84439 ASSAY OF FREE THYROXINE: CPT | Performed by: EMERGENCY MEDICINE

## 2023-02-11 PROCEDURE — 93005 ELECTROCARDIOGRAM TRACING: CPT | Performed by: EMERGENCY MEDICINE

## 2023-02-11 PROCEDURE — 93005 ELECTROCARDIOGRAM TRACING: CPT

## 2023-02-11 PROCEDURE — 99283 EMERGENCY DEPT VISIT LOW MDM: CPT

## 2023-02-11 PROCEDURE — 85025 COMPLETE CBC W/AUTO DIFF WBC: CPT

## 2023-02-11 PROCEDURE — 71046 X-RAY EXAM CHEST 2 VIEWS: CPT

## 2023-02-11 PROCEDURE — 80053 COMPREHEN METABOLIC PANEL: CPT

## 2023-02-11 PROCEDURE — 84443 ASSAY THYROID STIM HORMONE: CPT | Performed by: EMERGENCY MEDICINE

## 2023-02-11 PROCEDURE — 63700000 HC SELF-ADMINISTRABLE DRUG

## 2023-02-11 PROCEDURE — 36415 COLL VENOUS BLD VENIPUNCTURE: CPT

## 2023-02-11 PROCEDURE — 85379 FIBRIN DEGRADATION QUANT: CPT | Performed by: EMERGENCY MEDICINE

## 2023-02-11 RX ORDER — LIDOCAINE 560 MG/1
1 PATCH PERCUTANEOUS; TOPICAL; TRANSDERMAL ONCE
Status: DISCONTINUED | OUTPATIENT
Start: 2023-02-11 | End: 2023-02-11 | Stop reason: HOSPADM

## 2023-02-11 RX ADMIN — LIDOCAINE 1 PATCH: 4 PATCH TOPICAL at 16:46

## 2023-02-11 ASSESSMENT — ENCOUNTER SYMPTOMS
CONSTIPATION: 0
BACK PAIN: 1
SEIZURES: 0
FEVER: 0
DIARRHEA: 0
DIZZINESS: 0
FLANK PAIN: 0
ARTHRALGIAS: 0
CONFUSION: 0
HEADACHES: 0
NECK STIFFNESS: 0
LIGHT-HEADEDNESS: 0
WEAKNESS: 0
NECK PAIN: 0
SHORTNESS OF BREATH: 0
DYSURIA: 0
ABDOMINAL PAIN: 0
PALPITATIONS: 0
EYE PAIN: 0
SORE THROAT: 0
VOMITING: 0
HEMATURIA: 0
NAUSEA: 0
COUGH: 0
WHEEZING: 0
FATIGUE: 0
CHEST TIGHTNESS: 0
CHILLS: 0
DIAPHORESIS: 0
COLOR CHANGE: 0

## 2023-02-11 NOTE — ED PROVIDER NOTES
Emergency Medicine Note  HPI   HISTORY OF PRESENT ILLNESS     Patient is a 58-year-old female with a past medical history of CHF, CAD, MI, sleep apnea, stroke, type 2 diabetes who arrives to the emergency department for intermittent left upper back pain and right anterior rib pain just below the breastbone x2 weeks.  No associated symptoms.  Patient states she seen her cardiologist this week and was told to follow-up with PCP for further evaluation.  No treatments prior to arrival.  Patient is asymptomatic on exam.  Patient reports intermittent, sharp, stabbing, 4 out of 10 pain with no radiation or aggravating factors.  Patient states pain lasts approximately 10 to 15 seconds.  Patient denies shortness of breath, palpitations, cough, fever, chills, headache, vision changes, diaphoresis, tinnitus, lightheadedness, dizziness, numbness, tingling, nausea, vomiting, diarrhea, or constipation.        History provided by:  Patient  Back Pain  Associated symptoms: chest pain (Anterior chest pain below right breast)    Associated symptoms: no abdominal pain, no dysuria, no fever, no headaches and no weakness          Patient History   PAST HISTORY     Reviewed from Nursing Triage:       Past Medical History:   Diagnosis Date   • CHF (congestive heart failure) (CMS/HCC)    • Coronary artery disease    • Myocardial infarct (CMS/HCC)    • Sleep apnea    • Stroke (CMS/HCC)    • Type 2 diabetes mellitus (CMS/HCC)        History reviewed. No pertinent surgical history.    History reviewed. No pertinent family history.    Social History     Tobacco Use   • Smoking status: Never   • Smokeless tobacco: Never   Substance Use Topics   • Alcohol use: Never   • Drug use: Never         Review of Systems   REVIEW OF SYSTEMS     Review of Systems   Constitutional: Negative for chills, diaphoresis, fatigue and fever.   HENT: Negative for ear pain and sore throat.    Eyes: Negative for pain and visual disturbance.   Respiratory: Negative for  cough, chest tightness, shortness of breath and wheezing.    Cardiovascular: Positive for chest pain (Anterior chest pain below right breast). Negative for palpitations.   Gastrointestinal: Negative for abdominal pain, constipation, diarrhea, nausea and vomiting.   Genitourinary: Negative for dysuria, flank pain and hematuria.   Musculoskeletal: Positive for back pain (Around the left trapeze muscle). Negative for arthralgias, gait problem, neck pain and neck stiffness.   Skin: Negative for color change and rash.   Neurological: Negative for dizziness, seizures, syncope, weakness, light-headedness and headaches.   Psychiatric/Behavioral: Negative for confusion.   All other systems reviewed and are negative.        VITALS     ED Vitals    Date/Time Temp Pulse Resp BP SpO2 [facility]   02/11/23 1404 36.7 °C (98 °F) 120 18 151/86 98 % [facility]                       Physical Exam   PHYSICAL EXAM     Physical Exam  Vitals and nursing note reviewed.   Constitutional:       General: She is not in acute distress.     Appearance: Normal appearance. She is well-developed. She is not ill-appearing, toxic-appearing or diaphoretic.   HENT:      Head: Normocephalic and atraumatic.      Nose: No congestion or rhinorrhea.      Mouth/Throat:      Mouth: Mucous membranes are moist.   Eyes:      Extraocular Movements: Extraocular movements intact.      Conjunctiva/sclera: Conjunctivae normal.      Pupils: Pupils are equal, round, and reactive to light.   Cardiovascular:      Rate and Rhythm: Regular rhythm. Tachycardia present.      Pulses: Normal pulses.      Heart sounds: Normal heart sounds.   Pulmonary:      Effort: Pulmonary effort is normal. No respiratory distress.      Breath sounds: Normal breath sounds.   Abdominal:      General: Bowel sounds are normal.      Palpations: Abdomen is soft.      Tenderness: There is no abdominal tenderness. There is no guarding or rebound.   Musculoskeletal:         General: No tenderness (Pain and  tenderness around the left trapeze region).      Cervical back: Normal range of motion and neck supple.      Right lower leg: No edema.      Left lower leg: No edema.   Skin:     General: Skin is warm and dry.      Capillary Refill: Capillary refill takes less than 2 seconds.   Neurological:      General: No focal deficit present.      Mental Status: She is alert and oriented to person, place, and time.   Psychiatric:         Mood and Affect: Mood normal.         Behavior: Behavior normal.           PROCEDURES     Procedures     DATA     Results     None          Imaging Results          X-RAY CHEST 2 VIEWS (Final result)  Result time 02/11/23 15:37:34    Final result                 Impression:    IMPRESSION: No acute cardiopulmonary process.             Narrative:    CLINICAL HISTORY: Chest/back pain    COMMENT:  2 views of the chest are compared the x-ray from 08/17/2021.    Cardiovascular silhouette within normal limits. The costophrenic angles are  clear. There is no focal lung consolidation or pneumothorax.                                ECG 12 lead             Scoring tools                                  ED Course & MDM   MDM / ED COURSE / CLINICAL IMPRESSION / DISPO     Medical Decision Making  Impression: Patient is alert, oriented, well-appearing, and in no acute distress.  Patient presents to the ED for intermittent left upper back pain and right anterior rib pain just below the breastbone x2 weeks.  No associated symptoms.  Patient states she seen her cardiologist this week and was told to follow-up with PCP for further evaluation.  No treatments prior to arrival.  Patient is asymptomatic on exam.  PERRLA.  +2 carotid and radial pulses.  Less than 2-second cap refill.  Neuro intact.  Lungs are clear in all fields.  Patient able to speak in full sentences without difficulty or accessory muscle use.  Patient able to ambulate without difficulty.  Bowel sounds present in all 4 quadrants.  Abdomen soft  nontender.  Normal ROM of the upper and lower extremities on examination.  Patient is afebrile on exam.  VSS with an SPO2 of 98% on room air. CXR IMPRESSION: No acute cardiopulmonary process.  Troponin 4.5.  CBC obtained and unremarkable.  D-dimer 0.41.  GFR 57.4 otherwise CMP obtained and unremarkable.  Plan: CBC, CMP, troponin, D-dimer, EKG, CXR, reassess  Dispo: Discharge home with strict return precautions.  Follow-up with PCP in 2 to 3 days for further evaluation.  Diagnosis and management discussed with patient who understood and is comfortable with discharge plan.      Amount and/or Complexity of Data Reviewed  Labs: ordered.  Radiology: ordered.  ECG/medicine tests: ordered.      Risk  OTC drugs.          ED Course as of 02/11/23 1834   Sat Feb 11, 2023   1426 Called patient's name in the waiting room with no response.  Called patient cell phone no response. [JG]   1430 Patient evaluated for intermittent left upper back pain and right anterior rib pain just below the breastbone x2 weeks.  No associated symptoms.  Patient states she seen her cardiologist this week and was told to follow-up with PCP for further evaluation.  No treatments prior to arrival.  Patient is asymptomatic on exam. [JG]   1613 CXR reviewed. IMPRESSION: No acute cardiopulmonary process. [JG]   1823 The patient's troponin and D-dimer are both negative.  She has some tachycardia which I am uncertain of whether or not it is anxiety related.  I have added thyroid function tests.    I will be discharging the patient with the tachycardia.  We again emphasized with the patient she needs to follow-up with her primary care physician on Monday.  The patient has an allergy to beta-blocker so we can start a beta-blocker.  She is extremely well-appearing and nontoxic.  I feel she is safe to follow-up early next week.  [SB]   1833 Strict return precautions discussed with patient.  Patient will follow-up with PCP and cardiology as soon as possible for  further evaluation of tachycardia.  Diagnosis and management discussed with patient who understood and is comfortable with discharge plan. [JG]      ED Course User Index  [JG] Fuentes Ma CRNP  [SB] Jonnathan Huang MD     Clinical Impression      None               Fuentes Ma CRNP  02/11/23 5432

## 2023-02-11 NOTE — ED ATTESTATION NOTE
Procedures  Physical Exam  Review of Systems  [facility]    2/11/20232:42 PM  I have personally seen and examined the patient. I personally performed the key components of the encounter and provided a substantive portion of the care and medical decision making for this patient.     I reviewed and agree with the PA/NP/Resident's assessment and plan of care, with any exceptions as documented below.    My focused history, examination, assessment, and plan of care of Laurence Chang is as follows:  The patient presents with left neck pain that lasted 10 to 15 seconds then resolved.  Patient has had 5-6 episodes today.  There were no other associated symptoms.  I asked the patient what she is concerned about and she then tells me about how she had an abnormal finding and her gynecologist wants her to have an MRI.  This is causing her some concern.    Patient is also having some pain under her right breast that started 6 days ago.  She saw her cardiologist who is not concerned with this finding.  Exam: Left trapezius pain to mild palpation  There is no rash  Rate rhythm regular, S1-S2 heard, no murmurs  Impression/Plan/Medical Decision Making: We will do blood work to help rule out any acute life-threatening abnormalities.    Vital Signs Review: Vital signs have been reviewed. The oxygen saturation is  SpO2: 98 % which is normal.    I was physically present for the key/critical portions of the following procedures: None    This document was created using Dragon dictation software.  There might be some typographical errors due to this technology.    We are in a pandemic with increased volumes and decreased capacity.      Jonnathan Huang MD  02/11/23 [id number]

## 2023-02-11 NOTE — DISCHARGE INSTRUCTIONS
You were evaluated in the emergency department today for back pain and chest pain.  Your evaluation did not show signs of medical conditions requiring emergent intervention at this time.  We recommend you take 600 mg ibuprofen every 6 hours or Tylenol 650 mg every 6 hours as needed for pain. Please schedule an appointment for follow-up with your primary care physician within 2 days for further evaluation.  Please contact your cardiologist on Monday for further evaluation of sinus tachycardia.  Return to the emergency department if you experience worsening back pain, difficulty walking, fevers, numbness, tingling, incontinence, shortness of breath, chest pain, or any other concerning symptoms.

## 2023-02-13 LAB
ATRIAL RATE: 125
P AXIS: 44
PR INTERVAL: 148
QRS DURATION: 72
QT INTERVAL: 308
QTC CALCULATION(BAZETT): 444
R AXIS: -2
T WAVE AXIS: 26
VENTRICULAR RATE: 125

## 2023-03-16 NOTE — PROGRESS NOTES
"PATIENT ID:  Laurence Chang is a 58 y.o. female.  REFERRING PHYSICIAN:   Rosanna Mcgee MD  [address] [place] [address]  PRIMARY CARE PROVIDER:  GIL Vera DO      HPI:   Ms. Laurence Chang is a 58 y.o. yo lady  with PMH CHF, MI, CVA, DM (latest A1C=2022), hashimoto's, HTN, HLD, CKD,  who is referred by Dr. Mcgee. Pt reports intermittent light spotting for years. Had embx 2020 per records, but I do not have these results. Latest w/up with Pelvic US showing a normal EMS, 4ml, but 6.0 cm left adnexal mass and small amount of free pelvic fluid. EMBx performed 23 is suboptimal with scant superficial stripes of inactive endometrium. She had an MRI 23 to further evaluate mass, re demonstrating 6.4 x4.3cm solid left adnexal mass with heterogenous centrally enhancing area. Per report, it could represent an adenomyoma, but neoplasm cannot be excluded. The uterus measures 4.5 x 3.8x 8.8cm with thickened anterior cervical wall. Has not had spotting for a few wks.     Latest PAP smear NIL 2023. HPV testing not performed.     Recent weight loss of 10-12 lbs over past 4 mos since starting Ozempic for DM. Relates to early satiety for yrs- states she was \"born with a small stomach\". One episode pelvic/abdominal pain recently, self resolved. Constipation - manages with magnesium. Denies nausea vomiting. C/o urinary frequency- states prior urologist offered her a medication, but she declined. NELLI with cough/colds.     FH +ovarian cancer, MGM dx in her 80s. Mother +colon cancer in a polyp, 2 great aunts with breast cancer, dx >65y.o. Pt was told she should have genetic testing, but never completed.      Pt reports removal of a precancerous polyp during colonoscopy 6-7 yrs ago.     Hx MI , CVA (8-9 yrs ago). On 81mg ASA. No further blood thinners.  Her cardiologist Dr. Fried is retiring and switching to Dr. Khan in same practice. Reports she gets severe depression on beta blockers and many " cardiac meds and difficulty tolerating. ? New arrithymia    She does not follow with nephrologist reports fluctuating kidney function.     Medical History:   Past Medical History:   Diagnosis Date   • CHF (congestive heart failure) (CMS/HCC)    • Coronary artery disease    • Myocardial infarct (CMS/HCC)    • Sleep apnea    • Stroke (CMS/HCC)    • Type 2 diabetes mellitus (CMS/HCC)        Surgical History:   Past Surgical History:   Procedure Laterality Date   • COLON SURGERY         Gynecologic/Obstetric History:  Menstrual History:  OB History        1    Para   1    Term                AB        Living   1       SAB        IAB        Ectopic        Multiple        Live Births   1                No LMP recorded.       Age at menarche: 17  Age at menopause: 51       Prior abnormal pap: yes, once with hpv    HPV vaccination: did not receive   HRT use: never    Social History:   Social History     Tobacco Use   • Smoking status: Never   • Smokeless tobacco: Never   Vaping Use   • Vaping status: Never Used   Substance Use Topics   • Alcohol use: Never   • Drug use: Never       Family History: History reviewed. No pertinent family history.    Allergies: Beta-blockers (beta-adrenergic blocking agts), Clindamycin, Lisinopril, Statins-hmg-coa reductase inhibitors, and Sulfa (sulfonamide antibiotics)    Medications:   Current Outpatient Medications   Medication Sig Dispense Refill   • amLODIPine (NORVASC) 2.5 mg tablet Take 2.5 mg by mouth as needed.     • ARIPiprazole (ABILIFY) 5 mg tablet Take 5 mg by mouth. 5 mg in am  5 mg at 3pm  20 mg at HS     • aspirin 81 mg enteric coated tablet Take 81 mg by mouth nightly.     • brexpiprazole (REXULTI) 4 mg tablet Take 4 mg by mouth nightly.     • cholecalciferol, vitamin D3, (VITAMIN D3 ORAL) Take by mouth.     • clonazePAM (klonoPIN) 0.5 mg tablet Take 0.5 mg by mouth nightly.     • gabapentin (NEURONTIN) 300 mg capsule Take 300 mg by mouth. 300 mg in  Am  300 at  "noon  900mg at HS     • gabapentin (NEURONTIN) 400 mg capsule Take 400 mg by mouth 3 (three) times a day.     • insulin aspart U-100 (NovoLOG) 100 unit/mL (3 mL) pen Inject under the skin. As needed     • insulin degludec U-100 (TRESIBA FLEXTOUCH) 100 unit/mL (3 mL) pen Inject 10 Units under the skin nightly.     • magnesium citrate 125 mg capsule Take 170 mg by mouth nightly.     • metFORMIN XR (GLUCOPHAGE-XR) 500 mg 24 hr tablet Take 500 mg by mouth daily with breakfast.     • omega 3-dha-epa-fish oil (FISH OIL) 1,200 (144-216) mg capsule Take by mouth.     • tranylcypromine (PARNATE) 10 mg tablet Take 20 mg by mouth 3 (three) times a day.     • ziprasidone HCl (GEODON ORAL) Take 20 mg of amoxicillin by mouth 3 (three) times a day. 20 mg in  Am  20 mg at 3pm   10 mg at HS     • antiox #8/om3/dha/epa/lut/zeax (PRESERVISION AREDS 2, OMEGA-3, ORAL) Take by mouth.     • docusate sodium (COLACE) 100 mg capsule Take 100 mg by mouth 3 (three) times a day.     • liraglutide (VICTOZA) 0.6 mg/0.1 mL (18 mg/3 mL) injection Inject 1.8 mg under the skin daily.       No current facility-administered medications for this visit.       Physical Exam:  Vital Signs:  Visit Vitals  /88 (BP Location: Right upper arm, Patient Position: Sitting)   Pulse 97   Temp 36.7 °C (98.1 °F) (Oral)   Ht 1.473 m (4' 10\")   Wt 103 kg (226 lb)   SpO2 97%   BMI 47.23 kg/m²       General: well-developed, well-nourished, no apparent distress  Neck: supple, no masses  Lymphatic: no supraclavicular, cervical, or inguinal adenopathy  Respiratory: lungs clear to auscultation bilaterally, normal respiratory effort  Cardiovascular: regular rate and rhythm, no murmurs, rubs, gallops.   Extremity: no clubbing, cyanosis, edema  GI: abdomen soft, non-distended, non-tender, no hepatosplenomegaly, no masses  Neurologic: alert & oriented x3, no gross deficits  Psychiatric: mood and affect normal  Musculoskeletal: no deformity or gross strength " deficit  Gynecologic: normal external female genitalia.    Speculum: normal appearing cervix and vagina   Bimanual: uterus normal sized,mobile, adenxa not palpable   Rectovaginal: no masses/nodularity            ASSESEMENT/PLAN:  58 y.o. lady with a  left adnexal mass   Problem List Items Addressed This Visit        Circulatory    CHF (congestive heart failure) (CMS/[facility])    Current Assessment & Plan     Needs cardiac clearance before the surgery.             Genitourinary    Adnexal mass - Primary    Current Assessment & Plan     I personally reviewed the patient's imaging reports. I do not have the images unfortunately, however report says left adnexal mass is concerning for possible fibroma. This would clinically make sense as she is having post menopausal bleeding as well.  Explained the only way to definitively rule out malignancy is with a tissue diagnosis. Biopsy of the mass is contraindicated due to risk of seeding if the mass is malignant. Reviewed plan for laparoscopic evaluation with removal of abnormal tube and ovary. Explained the concept of frozen section. If the frozen is benign, plan would be for removal of both tubes and ovaries as well as a hysterectomy given her post menopausal bleeding. If malignant would proceed with staging with pelvic and para-aortic lymph node dissection and omentectomy. Given her BMI, will perform this procedure robotically. Discussed risk of laparotomy.     I personally discussed the risk benefits and alternatives of the surgery. The risks of the surgery include but are not limited to bleeding, blood transfusion, infection, VTE, injury to surrounding structures such as bowel bladder ureters and blood vessels, cardiac even including MI, stroke, blood clot. She is at increased risk due to her cardiac history, hx of stroke, DM and obesity. All questions were answered.       Given her significant medical hx, she needs to see her PCP, anesthesia, cardiology and her psychiatrist  before safely proceeding. She is aware of this. If we are unable to proceed to the OR, will repeat imaging in 8 weeks.      Informed Consent Procedure    Indication for procedure: adnexal mass    Procedure: RA-TLH, BSO possible, PPALND, omentectomy, possible laparotomy.     Risks: as above    Alternatives: observation, biopsy    Risks of not having procedure: missing a cancer diagnosis or seeding possible cancer           Relevant Orders    Case request operating room: Robotic XI total laparoscopic hysterectomy, bilateral salpingo-oophorectomy, possible staging, cystoscopy (Completed)    Type and Screen [facility]    CBC    Basic metabolic panel       Mental Health    Depression    Current Assessment & Plan     She has severe refractory depression and is on an MAO-inhibitor. I asked her to follow up with her pyschiatrist and and anesthesia about the possible need to stop the medication for the procedure.                Lissa Levy MD

## 2023-03-21 ENCOUNTER — OFFICE VISIT (OUTPATIENT)
Dept: GYNECOLOGIC ONCOLOGY | Facility: CLINIC | Age: 58
End: 2023-03-21
Attending: STUDENT IN AN ORGANIZED HEALTH CARE EDUCATION/TRAINING PROGRAM
Payer: MEDICARE

## 2023-03-21 VITALS
DIASTOLIC BLOOD PRESSURE: 88 MMHG | OXYGEN SATURATION: 97 % | TEMPERATURE: 98.1 F | HEIGHT: 58 IN | HEART RATE: 97 BPM | BODY MASS INDEX: 47.44 KG/M2 | WEIGHT: 226 LBS | SYSTOLIC BLOOD PRESSURE: 136 MMHG

## 2023-03-21 DIAGNOSIS — F33.9 RECURRENT MAJOR DEPRESSIVE DISORDER, REMISSION STATUS UNSPECIFIED (CMS/HCC): ICD-10-CM

## 2023-03-21 DIAGNOSIS — I50.9 CONGESTIVE HEART FAILURE, UNSPECIFIED HF CHRONICITY, UNSPECIFIED HEART FAILURE TYPE (CMS/HCC): ICD-10-CM

## 2023-03-21 DIAGNOSIS — N94.89 ADNEXAL MASS: Primary | ICD-10-CM

## 2023-03-21 PROBLEM — F32.A DEPRESSION: Status: ACTIVE | Noted: 2023-03-21

## 2023-03-21 PROCEDURE — 99205 OFFICE O/P NEW HI 60 MIN: CPT | Performed by: STUDENT IN AN ORGANIZED HEALTH CARE EDUCATION/TRAINING PROGRAM

## 2023-03-21 RX ORDER — ACETAMINOPHEN 325 MG/1
975 TABLET ORAL
Status: CANCELLED | OUTPATIENT
Start: 2023-03-21

## 2023-03-21 RX ORDER — CELECOXIB 100 MG/1
200 CAPSULE ORAL
Status: CANCELLED | OUTPATIENT
Start: 2023-03-21

## 2023-03-21 RX ORDER — TRAMADOL HYDROCHLORIDE 50 MG/1
50 TABLET ORAL
Status: CANCELLED | OUTPATIENT
Start: 2023-03-21

## 2023-03-21 RX ORDER — GABAPENTIN 400 MG/1
400 CAPSULE ORAL 3 TIMES DAILY
COMMUNITY
End: 2023-04-17 | Stop reason: ENTERED-IN-ERROR

## 2023-03-21 RX ORDER — SODIUM CHLORIDE, SODIUM GLUCONATE, SODIUM ACETATE, POTASSIUM CHLORIDE AND MAGNESIUM CHLORIDE 30; 37; 368; 526; 502 MG/100ML; MG/100ML; MG/100ML; MG/100ML; MG/100ML
80 INJECTION, SOLUTION INTRAVENOUS CONTINUOUS
Status: CANCELLED | OUTPATIENT
Start: 2023-03-21 | End: 2023-03-22

## 2023-03-21 NOTE — ASSESSMENT & PLAN NOTE
She has severe refractory depression and is on an MAO-inhibitor. I asked her to follow up with her pyschiatrist and and anesthesia about the possible need to stop the medication for the procedure.

## 2023-03-21 NOTE — ASSESSMENT & PLAN NOTE
I personally reviewed the patient's imaging reports. I do not have the images unfortunately, however report says left adnexal mass is concerning for possible fibroma. This would clinically make sense as she is having post menopausal bleeding as well.  Explained the only way to definitively rule out malignancy is with a tissue diagnosis. Biopsy of the mass is contraindicated due to risk of seeding if the mass is malignant. Reviewed plan for laparoscopic evaluation with removal of abnormal tube and ovary. Explained the concept of frozen section. If the frozen is benign, plan would be for removal of both tubes and ovaries as well as a hysterectomy given her post menopausal bleeding. If malignant would proceed with staging with pelvic and para-aortic lymph node dissection and omentectomy. Given her BMI, will perform this procedure robotically. Discussed risk of laparotomy.     I personally discussed the risk benefits and alternatives of the surgery. The risks of the surgery include but are not limited to bleeding, blood transfusion, infection, VTE, injury to surrounding structures such as bowel bladder ureters and blood vessels, cardiac even including MI, stroke, blood clot. She is at increased risk due to her cardiac history, hx of stroke, DM and obesity. All questions were answered.       Given her significant medical hx, she needs to see her PCP, anesthesia, cardiology and her psychiatrist before safely proceeding. She is aware of this. If we are unable to proceed to the OR, will repeat imaging in 8 weeks.      Informed Consent Procedure    Indication for procedure: adnexal mass    Procedure: RA-TLH, BSO possible, PPALND, omentectomy, possible laparotomy.     Risks: as above    Alternatives: observation, biopsy    Risks of not having procedure: missing a cancer diagnosis or seeding possible cancer

## 2023-03-21 NOTE — LETTER
"2023     GIL Vera DO  [address]  [id number]  [place] [address]    Patient: Laurenec Chang  YOB: 1965  Date of Visit: 3/21/2023      Dear Dr. Vera:    Thank you for referring Laurence Chang to me for evaluation. Below are my notes for this consultation.    If you have questions, please do not hesitate to call me. I look forward to following your patient along with you.         Sincerely,        Lissa Levy MD        CC: MD Rosanna Meek MD Turker, Lauren B, MD  3/21/2023  3:02 PM  Signed  PATIENT ID:  Laurence Chang is a 58 y.o. female.  REFERRING PHYSICIAN:   Rosanna Mcgee MD  [address] [place] [address]  PRIMARY CARE PROVIDER:  GIL Vera DO      HPI:   Ms. Laurence Chang is a 58 y.o. yo lady  with PMH CHF, MI, CVA, DM (latest A1C=2022), hashimoto's, HTN, HLD, CKD,  who is referred by Dr. Mcgee. Pt reports intermittent light spotting for years. Had embx 2020 per records, but I do not have these results. Latest w/up with Pelvic US showing a normal EMS, 4ml, but 6.0 cm left adnexal mass and small amount of free pelvic fluid. EMBx performed 23 is suboptimal with scant superficial stripes of inactive endometrium. She had an MRI 23 to further evaluate mass, re demonstrating 6.4 x4.3cm solid left adnexal mass with heterogenous centrally enhancing area. Per report, it could represent an adenomyoma, but neoplasm cannot be excluded. The uterus measures 4.5 x 3.8x 8.8cm with thickened anterior cervical wall. Has not had spotting for a few wks.     Latest PAP smear NIL 2023. HPV testing not performed.     Recent weight loss of 10-12 lbs over past 4 mos since starting Ozempic for DM. Relates to early satiety for yrs- states she was \"born with a small stomach\". One episode pelvic/abdominal pain recently, self resolved. Constipation - manages with magnesium. Denies nausea vomiting. C/o urinary " frequency- states prior urologist offered her a medication, but she declined. NELLI with cough/colds.     FH +ovarian cancer, MGM dx in her 80s. Mother +colon cancer in a polyp, 2 great aunts with breast cancer, dx >65y.o. Pt was told she should have genetic testing, but never completed.      Pt reports removal of a precancerous polyp during colonoscopy 6-7 yrs ago.     Hx MI , CVA (8-9 yrs ago). On 81mg ASA. No further blood thinners.  Her cardiologist Dr. Fried is retiring and switching to Dr. Khan in same practice. Reports she gets severe depression on beta blockers and many cardiac meds and difficulty tolerating. ? New arrithymia    She does not follow with nephrologist reports fluctuating kidney function.     Medical History:   Past Medical History:   Diagnosis Date   • CHF (congestive heart failure) (CMS/HCC)    • Coronary artery disease    • Myocardial infarct (CMS/HCC)    • Sleep apnea    • Stroke (CMS/HCC)    • Type 2 diabetes mellitus (CMS/HCC)        Surgical History:   Past Surgical History:   Procedure Laterality Date   • COLON SURGERY         Gynecologic/Obstetric History:  Menstrual History:  OB History        1    Para   1    Term                AB        Living   1       SAB        IAB        Ectopic        Multiple        Live Births   1                No LMP recorded.       Age at menarche: 17  Age at menopause: 51       Prior abnormal pap: yes, once with hpv    HPV vaccination: did not receive   HRT use: never    Social History:   Social History     Tobacco Use   • Smoking status: Never   • Smokeless tobacco: Never   Vaping Use   • Vaping status: Never Used   Substance Use Topics   • Alcohol use: Never   • Drug use: Never       Family History: History reviewed. No pertinent family history.    Allergies: Beta-blockers (beta-adrenergic blocking agts), Clindamycin, Lisinopril, Statins-hmg-coa reductase inhibitors, and Sulfa (sulfonamide antibiotics)    Medications:   Current  "Outpatient Medications   Medication Sig Dispense Refill   • amLODIPine (NORVASC) 2.5 mg tablet Take 2.5 mg by mouth as needed.     • ARIPiprazole (ABILIFY) 5 mg tablet Take 5 mg by mouth. 5 mg in am  5 mg at 3pm  20 mg at HS     • aspirin 81 mg enteric coated tablet Take 81 mg by mouth nightly.     • brexpiprazole (REXULTI) 4 mg tablet Take 4 mg by mouth nightly.     • cholecalciferol, vitamin D3, (VITAMIN D3 ORAL) Take by mouth.     • clonazePAM (klonoPIN) 0.5 mg tablet Take 0.5 mg by mouth nightly.     • gabapentin (NEURONTIN) 300 mg capsule Take 300 mg by mouth. 300 mg in  Am  300 at noon  900mg at HS     • gabapentin (NEURONTIN) 400 mg capsule Take 400 mg by mouth 3 (three) times a day.     • insulin aspart U-100 (NovoLOG) 100 unit/mL (3 mL) pen Inject under the skin. As needed     • insulin degludec U-100 (TRESIBA FLEXTOUCH) 100 unit/mL (3 mL) pen Inject 10 Units under the skin nightly.     • magnesium citrate 125 mg capsule Take 170 mg by mouth nightly.     • metFORMIN XR (GLUCOPHAGE-XR) 500 mg 24 hr tablet Take 500 mg by mouth daily with breakfast.     • omega 3-dha-epa-fish oil (FISH OIL) 1,200 (144-216) mg capsule Take by mouth.     • tranylcypromine (PARNATE) 10 mg tablet Take 20 mg by mouth 3 (three) times a day.     • ziprasidone HCl (GEODON ORAL) Take 20 mg of amoxicillin by mouth 3 (three) times a day. 20 mg in  Am  20 mg at 3pm   10 mg at HS     • antiox #8/om3/dha/epa/lut/zeax (PRESERVISION AREDS 2, OMEGA-3, ORAL) Take by mouth.     • docusate sodium (COLACE) 100 mg capsule Take 100 mg by mouth 3 (three) times a day.     • liraglutide (VICTOZA) 0.6 mg/0.1 mL (18 mg/3 mL) injection Inject 1.8 mg under the skin daily.       No current facility-administered medications for this visit.       Physical Exam:  Vital Signs:  Visit Vitals  /88 (BP Location: Right upper arm, Patient Position: Sitting)   Pulse 97   Temp 36.7 °C (98.1 °F) (Oral)   Ht 1.473 m (4' 10\")   Wt 103 kg (226 lb)   SpO2 97%   BMI " 47.23 kg/m²       General: well-developed, well-nourished, no apparent distress  Neck: supple, no masses  Lymphatic: no supraclavicular, cervical, or inguinal adenopathy  Respiratory: lungs clear to auscultation bilaterally, normal respiratory effort  Cardiovascular: regular rate and rhythm, no murmurs, rubs, gallops.   Extremity: no clubbing, cyanosis, edema  GI: abdomen soft, non-distended, non-tender, no hepatosplenomegaly, no masses  Neurologic: alert & oriented x3, no gross deficits  Psychiatric: mood and affect normal  Musculoskeletal: no deformity or gross strength deficit  Gynecologic: normal external female genitalia.    Speculum: normal appearing cervix and vagina   Bimanual: uterus normal sized,mobile, adenxa not palpable   Rectovaginal: no masses/nodularity            ASSESEMENT/PLAN:  58 y.o. lady with a  left adnexal mass   Problem List Items Addressed This Visit        Circulatory    CHF (congestive heart failure) (CMS/HCC)    Current Assessment & Plan     Needs cardiac clearance before the surgery.             Genitourinary    Adnexal mass - Primary    Current Assessment & Plan     I personally reviewed the patient's imaging reports. I do not have the images unfortunately, however report says left adnexal mass is concerning for possible fibroma. This would clinically make sense as she is having post menopausal bleeding as well.  Explained the only way to definitively rule out malignancy is with a tissue diagnosis. Biopsy of the mass is contraindicated due to risk of seeding if the mass is malignant. Reviewed plan for laparoscopic evaluation with removal of abnormal tube and ovary. Explained the concept of frozen section. If the frozen is benign, plan would be for removal of both tubes and ovaries as well as a hysterectomy given her post menopausal bleeding. If malignant would proceed with staging with pelvic and para-aortic lymph node dissection and omentectomy. Given her BMI, will perform this  procedure robotically. Discussed risk of laparotomy.     I personally discussed the risk benefits and alternatives of the surgery. The risks of the surgery include but are not limited to bleeding, blood transfusion, infection, VTE, injury to surrounding structures such as bowel bladder ureters and blood vessels, cardiac even including MI, stroke, blood clot. She is at increased risk due to her cardiac history, hx of stroke, DM and obesity. All questions were answered.       Given her significant medical hx, she needs to see her PCP, anesthesia, cardiology and her psychiatrist before safely proceeding. She is aware of this. If we are unable to proceed to the OR, will repeat imaging in 8 weeks.      Informed Consent Procedure    Indication for procedure: adnexal mass    Procedure: RA-TLH, BSO possible, PPALND, omentectomy, possible laparotomy.     Risks: as above    Alternatives: observation, biopsy    Risks of not having procedure: missing a cancer diagnosis or seeding possible cancer           Relevant Orders    Case request operating room: Robotic XI total laparoscopic hysterectomy, bilateral salpingo-oophorectomy, possible staging, cystoscopy (Completed)    Type and Screen [facility]    CBC    Basic metabolic panel       Mental Health    Depression    Current Assessment & Plan     She has severe refractory depression and is on an MAO-inhibitor. I asked her to follow up with her pyschiatrist and and anesthesia about the possible need to stop the medication for the procedure.                Lissa Levy MD

## 2023-04-17 ENCOUNTER — APPOINTMENT (OUTPATIENT)
Dept: PREADMISSION TESTING | Facility: HOSPITAL | Age: 58
End: 2023-04-17
Attending: STUDENT IN AN ORGANIZED HEALTH CARE EDUCATION/TRAINING PROGRAM
Payer: MEDICARE

## 2023-04-17 ENCOUNTER — APPOINTMENT (OUTPATIENT)
Dept: LAB | Facility: HOSPITAL | Age: 58
End: 2023-04-17
Attending: STUDENT IN AN ORGANIZED HEALTH CARE EDUCATION/TRAINING PROGRAM
Payer: MEDICARE

## 2023-04-17 VITALS
OXYGEN SATURATION: 99 % | HEIGHT: 58 IN | HEART RATE: 125 BPM | WEIGHT: 223 LBS | SYSTOLIC BLOOD PRESSURE: 143 MMHG | TEMPERATURE: 99.3 F | DIASTOLIC BLOOD PRESSURE: 83 MMHG | RESPIRATION RATE: 18 BRPM | BODY MASS INDEX: 46.81 KG/M2

## 2023-04-17 DIAGNOSIS — Z01.818 PREOP EXAMINATION: Primary | ICD-10-CM

## 2023-04-17 DIAGNOSIS — N94.89 ADNEXAL MASS: ICD-10-CM

## 2023-04-17 PROBLEM — E06.3 HASHIMOTO'S DISEASE: Status: ACTIVE | Noted: 2023-04-13

## 2023-04-17 PROBLEM — K21.00 REFLUX ESOPHAGITIS: Status: ACTIVE | Noted: 2023-04-17

## 2023-04-17 PROBLEM — R12 HEARTBURN: Status: ACTIVE | Noted: 2021-06-22

## 2023-04-17 PROBLEM — Z80.0 FAMILY HISTORY OF COLON CANCER: Status: ACTIVE | Noted: 2021-06-22

## 2023-04-17 LAB
ABO + RH BLD: NORMAL
ALBUMIN SERPL-MCNC: 4 G/DL (ref 3.4–5)
ALP SERPL-CCNC: 73 IU/L (ref 35–126)
ALT SERPL-CCNC: 52 IU/L (ref 11–54)
ANION GAP SERPL CALC-SCNC: 10 MEQ/L (ref 3–15)
AST SERPL-CCNC: 36 IU/L (ref 15–41)
BILIRUB SERPL-MCNC: 0.5 MG/DL (ref 0.3–1.2)
BLD GP AB SCN SERPL QL: NEGATIVE
BLOOD BANK CMNT PATIENT-IMP: NORMAL
BUN SERPL-MCNC: 9 MG/DL (ref 8–20)
CALCIUM SERPL-MCNC: 9.4 MG/DL (ref 8.9–10.3)
CHLORIDE SERPL-SCNC: 104 MEQ/L (ref 98–109)
CO2 SERPL-SCNC: 25 MEQ/L (ref 22–32)
CREAT SERPL-MCNC: 0.9 MG/DL (ref 0.6–1.1)
D AG BLD QL: POSITIVE
ERYTHROCYTE [DISTWIDTH] IN BLOOD BY AUTOMATED COUNT: 12.9 % (ref 11.7–14.4)
GFR SERPL CREATININE-BSD FRML MDRD: >60 ML/MIN/1.73M*2
GLUCOSE SERPL-MCNC: 138 MG/DL (ref 70–99)
HCT VFR BLDCO AUTO: 42.1 % (ref 35–45)
HGB BLD-MCNC: 14 G/DL (ref 11.8–15.7)
LABORATORY COMMENT REPORT: NORMAL
MCH RBC QN AUTO: 29.9 PG (ref 28–33.2)
MCHC RBC AUTO-ENTMCNC: 33.3 G/DL (ref 32.2–35.5)
MCV RBC AUTO: 89.8 FL (ref 83–98)
PDW BLD AUTO: 10.2 FL (ref 9.4–12.3)
PLATELET # BLD AUTO: 266 K/UL (ref 150–369)
POTASSIUM SERPL-SCNC: 4.4 MEQ/L (ref 3.6–5.1)
PROT SERPL-MCNC: 6.7 G/DL (ref 6–8.2)
RBC # BLD AUTO: 4.69 M/UL (ref 3.93–5.22)
SODIUM SERPL-SCNC: 139 MEQ/L (ref 136–144)
SPECIMEN EXP DATE BLD: NORMAL
WBC # BLD AUTO: 6.6 K/UL (ref 3.8–10.5)

## 2023-04-17 PROCEDURE — 36415 COLL VENOUS BLD VENIPUNCTURE: CPT

## 2023-04-17 PROCEDURE — 86901 BLOOD TYPING SEROLOGIC RH(D): CPT

## 2023-04-17 PROCEDURE — 85027 COMPLETE CBC AUTOMATED: CPT

## 2023-04-17 PROCEDURE — 80053 COMPREHEN METABOLIC PANEL: CPT

## 2023-04-17 RX ORDER — NYSTATIN 100000 U/G
CREAM TOPICAL AS NEEDED
COMMUNITY
Start: 2023-04-11

## 2023-04-17 RX ORDER — POLYETHYLENE GLYCOL 3350 17 G/17G
17 POWDER, FOR SOLUTION ORAL AS NEEDED
COMMUNITY

## 2023-04-17 RX ORDER — PEN NEEDLE, DIABETIC 32GX 5/32"
NEEDLE, DISPOSABLE MISCELLANEOUS
COMMUNITY
Start: 2023-02-06

## 2023-04-17 RX ORDER — THYROID, PORCINE 30 MG/1
15 TABLET ORAL
COMMUNITY
Start: 2023-03-20

## 2023-04-17 RX ORDER — PANTOPRAZOLE SODIUM 20 MG/1
20 TABLET, DELAYED RELEASE ORAL
COMMUNITY

## 2023-04-17 RX ORDER — SEMAGLUTIDE 2.68 MG/ML
1 INJECTION, SOLUTION SUBCUTANEOUS WEEKLY
COMMUNITY
Start: 2023-04-14

## 2023-04-17 RX ORDER — UBIQUINOL 100 MG
CAPSULE ORAL 3 TIMES DAILY
COMMUNITY
Start: 2023-03-30

## 2023-04-17 RX ORDER — THYROID, PORCINE 90 MG/1
1 TABLET ORAL EVERY MORNING
COMMUNITY
Start: 2023-04-11

## 2023-04-17 ASSESSMENT — PAIN SCALES - GENERAL: PAINLEVEL: 0-NO PAIN

## 2023-04-17 NOTE — PRE-PROCEDURE INSTRUCTIONS
1.      You will be called between 3pm -7pm one business day before your procedure  April 25, 2023   to tell you where and when to report.  If you do not receive a phone call by 6pm, please call the Admissions office at [phone] to determine the arrival time for your procedure.      2.        Please report to the Admissions or Short Procedure Unit, park in [address], on the day of your procedure.  Detailed directions will be given to you when you are called with arrival time.      3.      No solid food for EIGHT HOURS prior to surgery- No food after midnight.    PLEASE FOLLOW INSTRUCTION FROM DR RAMOS REGARDING FASTING ON THE NIGHT BEFORE YOUR SURGERY     4.      Early on the morning of the procedure please take your usual dose of the listed medications ARIPiprazole (ABILIFY), ziprasidone HCl (GEODON) with a sip of water    PLEASE FOLLOW INSTRUCTION FROM YOUR ENDOCRINOLOGIST REGARDING THE insulin degludec U-100 (TRESIBA  DOSING ON THE NIGHT BEFORE YOUR SURGERY     FOLLOW INSTRUCTION FROM YOUR ENDOCRINOLOGIST REGARDING metFORMIN XR (GLUCOPHAGE-XR)  DOSING PRIOR TO SURGERY    DO NOT TAKE insulin aspart U-100 (NovoLOG)  ON THE MORNING OF SURGERY    PLEASE FOLLOW INSTRUCTION FROM YOUR ENDOCRINOLOGIST REGARDING YOUR ARMOUR THYROID DOSING     PLEASE GET INSTRUCTION FROM THE CARDIOLOGIST REGARDING THE ASPIRIN.    NO ADVIL, MOTRIN, ALEVE, VITAMIN E, HERBAL SUPPLEMENTS 1 WEEK PRIOR TO SURGERY    YOU MAY TAKE TYLENOL FOR PAIN    BRING YOUR CPAP   5.      Other Instructions: You may brush your teeth the morning of the procedure. Rinse and spit, do not swallow.  Bring a list of your medications with dosages with you.  Use surgical wash as directed.   6.      If you develop a cold, cough, fever, rash, or other symptom prior to the data of the procedure, please report it to your physician immediately.     7.      If you need to cancel the procedure for any reason, please contact your physician.     8.      Make arrangements to  have someone drive you home from the procedure. If you have not arranged for transportation home, your surgery may be cancelled.      9.      You may not take public transportation unless you are accompanied by a responsible person.     10.      You may not drive a car or operate complex or potentially dangerous machinery for 24 hours following anesthesia and/or sedation.      11.      12.      If it is medically necessary for you to have a longer stay, you will be informed as soon as the decision is made.              Only bring essential items to the hospital.  Do not wear or bring anything of value to the hospital including jewelry of any kind, money, or wallet. Do not wear make-up or contact lenses. Do not BRING MEDICATIONS FROM HOME unless instructed to do so.  DO bring your hearing aids, glasses, and a case.        13.      No lotion, creams, powders, or oils on skin the morning of procedure        14.      Dress in comfortable clothes.     15.      If instructed, please bring a copy of your Advanced Directive (Living Will/Durable Power of ) on the day of your procedure.      16.            17.       18.    Ensuring your safety at all times is a very important part of out [facility] Culture of Safety . After having surgery and sedation, you are at risk for falling and balance issues.  Although you may feel awake, the effects of the medication can last up to 24 hours after anesthesia.  If you need to use the bathroom during your recovery period, nursing staff will escort you there and stay with you to ensure your safety.          Refrain from drinking alcohol and smoking cigarettes and marijuana for 24 hours prior to surgery.         Shower with antibacterial soap (DIAL) the night before and morning of your procedure.  If your procedure indicates the need for CHG antiseptic was (Bactoshield or Hibiclens), please use this instead and follow instructions as discussed at the time of your Pre-Admission Testing  visit or phone interview.     Above instructions reviewed with patient and patient acknowledges understanding.

## 2023-04-18 LAB
ANTI A1 LECTIN: POSITIVE
BLD GP AB INVEST PLASRBC-IMP: NORMAL
E ANTIGEN: POSITIVE
M ANTIGEN: NEGATIVE

## 2023-04-24 ENCOUNTER — TELEPHONE (OUTPATIENT)
Dept: GYNECOLOGIC ONCOLOGY | Facility: CLINIC | Age: 58
End: 2023-04-24
Payer: MEDICARE

## 2023-04-24 NOTE — TELEPHONE ENCOUNTER
Called patient back to discuss message. She reports mucous in throat/post nasal drip. Patient reports she had 1 episode of emesis yesterday. Patient reports vomit was mainly mucous. She denies any further episodes. She does report nausea. She is able to eat/drink without difficulty. She denies any abdominal/pelvic pain. She is passing gas and having bowel movements (last BM this morning). Patient took temp this morning 96.4. Advised patient to monitor symptoms and contact the office if she has any further episodes of vomiting or any new concerning signs/symptoms. Will move forward with surgery unless patient develops any new concerning signs/symptoms.    Advised her to follow-up with PCP as this seems more likely to be from post-nasal drip/possible allergies.    Discussed plan with Dr. Levy.

## 2023-04-24 NOTE — TELEPHONE ENCOUNTER
Pt says surgery is scheduled for Wednesday but for the last couple of days she has been vomiting yellow mucus.  She does not feel sick but she is not sure what she should do.

## 2023-04-25 ENCOUNTER — ANESTHESIA EVENT (OUTPATIENT)
Dept: OPERATING ROOM | Facility: HOSPITAL | Age: 58
Setting detail: HOSPITAL OUTPATIENT SURGERY
End: 2023-04-25
Payer: MEDICARE

## 2023-04-25 NOTE — ANESTHESIA PREPROCEDURE EVALUATION
"Relevant Problems   CARDIOVASCULAR   (+) CHF (congestive heart failure) (CMS/[facility])      NEUROLOGY   (+) Depression     Patient Active Problem List   Diagnosis   • Adnexal mass   • Depression   • CHF (congestive heart failure) (CMS/[facility])   • Family history of colon cancer   • Hashimoto's disease   • Heartburn   • Morbid obesity (CMS/[facility])   • Palpitations   • Reflux esophagitis       No current facility-administered medications for this encounter.       Prior to Admission medications    Medication Sig Start Date End Date Taking? Authorizing Provider   ALCOHOL PREP PADS pads, medicated 3 (three) times a day. 3/30/23   Mando Herrera MD   amLODIPine (NORVASC) 2.5 mg tablet Take 2.5 mg by mouth as needed. Take if B/P if above 140/85 in the PM    Susanne Herrera MD   ARIPiprazole (ABILIFY) 5 mg tablet Take 5 mg by mouth. 5 mg in am  5 mg at 3pm  20 mg at HS    Susanne Herrera MD ARMOUR THYROID 30 mg tablet daily with lunch. TAKE 1 TABLET DAILY IN THE AFTERNOON 3/20/23   Mando Herrera MD ARMOUR THYROID 90 mg tablet Take 1 tablet by mouth every morning. 30 mg at lunch 4/11/23   Mando Herrera MD   aspirin 81 mg enteric coated tablet Take 81 mg by mouth nightly.    Susanne Herrera MD   BD GRACE 2ND GEN PEN NEEDLE 32 gauge x 5/32\" needle USE 1 INJECTION 4 TIMES A DAY 2/6/23   Mando Herrera MD   brexpiprazole (REXULTI) 4 mg tablet Take 4 mg by mouth nightly.    Susanne Herrera MD   clonazePAM (klonoPIN) 0.5 mg tablet Take 0.5 mg by mouth nightly.    Susanne Herrera MD   gabapentin (NEURONTIN) 300 mg capsule Take 300 mg by mouth. 400 mg in  Am  400 at noon  1000mg at HS    Susanne Herrera MD   insulin aspart U-100 (NovoLOG) 100 unit/mL (3 mL) pen Inject under the skin. As needed 6 TID Plus sliding scale Units dosing    Susanne Herrera MD   insulin degludec U-100 (TRESIBA FLEXTOUCH) 100 unit/mL (3 mL) pen Inject 25 Units under the skin " "nightly.    Susanne Herrera MD   metFORMIN XR (GLUCOPHAGE-XR) 500 mg 24 hr tablet Take 500 mg by mouth 2 (two) times a day with meals.    Susanne Herrera MD   nystatin (MYCOSTATIN) 100,000 unit/gram cream as needed. 4/11/23   Mando Herrera MD   OZEMPIC 2 mg/dose (8 mg/3 mL) subcutaneous injection Inject under the skin once a week. Mondays 4/14/23   Mando Herrera MD   pantoprazole (PROTONIX) 20 mg EC tablet Take 20 mg by mouth daily with dinner.    Mando Herrera MD   polyethylene glycol (MIRALAX) 17 gram/dose powder Take 17 g by mouth as needed.    Mando Herrera MD   tranylcypromine (PARNATE) 10 mg tablet Take 20 mg by mouth 3 (three) times a day. \" Stopped for surgery  per anesthesiologist\"    Susanne Herrera MD   ziprasidone HCl (GEODON ORAL) Take 20 mg by mouth 3 (three) times a day. 20 mg in  Am  20 mg at 3pm   100 mg at HS    Susanne Herrera MD       CBC Results       04/17/23 02/11/23 08/17/21     1458 1640 1839    WBC 6.60 7.46 8.03    RBC 4.69 4.61 4.43    HGB 14.0 14.0 13.2    HCT 42.1 41.8 40.5    MCV 89.8 90.7 91.4    MCH 29.9 30.4 29.8    MCHC 33.3 33.5 32.6     288 277          BMP Results       04/17/23 02/11/23 08/17/21     1458 1640 1839     138 137    K 4.4 4.3 4.5    Cl 104 104 102    CO2 25 29 25    Glucose 138 91 182    BUN 9 14 17    Creatinine 0.9 1.0 0.8    Calcium 9.4 9.3 9.2    Anion Gap 10 5 10    EGFR >60.0 57.4 >60.0         Comment for K at 1640 on 02/11/23: Results obtained on plasma. Plasma Potassium values may be up to 0.4 mEQ/L less than serum values. The differences may be greater for patients with high platelet or white cell counts.    Comment for K at 1839 on 08/17/21: Results obtained on plasma. Plasma Potassium values may be up to 0.4 mEQ/L less than serum values. The differences may be greater for patients with high platelet or white cell counts.  SLIGHT HEMOLYSIS, RESULT MAY BE INCREASED.    "       No results found for: HCGPREGUR, PREGSERUM, HCG, HCGQUANT          No orders to display         ROS/Med Hx     Past Surgical History:   Procedure Laterality Date   • COLONOSCOPY W/ POLYPECTOMY     • CYSTECTOMY      mouth   • DENTAL SURGERY       Echo reviewed  Cardiology note reviewed      Physical Exam    Airway   Mallampati: III  Cardiovascular    Rate: normalPulmonary    Decreased breath sounds  Dental    Teeth Problems: missing        Anesthesia Plan    Plan: general    Technique: general endotracheal     Lines and Monitors: PIV     Airway: oral intubation   3 ASA  Anesthetic plan and risks discussed with: patient  Induction:    intravenous   Postop Plan:   Patient Disposition: inpatient floor planned admission   Pain Management: IV analgesics

## 2023-04-26 ENCOUNTER — HOSPITAL ENCOUNTER (OUTPATIENT)
Facility: HOSPITAL | Age: 58
Setting detail: HOSPITAL OUTPATIENT SURGERY
Discharge: HOME | End: 2023-04-26
Attending: STUDENT IN AN ORGANIZED HEALTH CARE EDUCATION/TRAINING PROGRAM | Admitting: STUDENT IN AN ORGANIZED HEALTH CARE EDUCATION/TRAINING PROGRAM
Payer: MEDICARE

## 2023-04-26 ENCOUNTER — ANESTHESIA (OUTPATIENT)
Dept: OPERATING ROOM | Facility: HOSPITAL | Age: 58
Setting detail: HOSPITAL OUTPATIENT SURGERY
End: 2023-04-26
Payer: MEDICARE

## 2023-04-26 VITALS
DIASTOLIC BLOOD PRESSURE: 63 MMHG | HEIGHT: 58 IN | WEIGHT: 223 LBS | RESPIRATION RATE: 18 BRPM | BODY MASS INDEX: 46.81 KG/M2 | HEART RATE: 106 BPM | SYSTOLIC BLOOD PRESSURE: 148 MMHG | TEMPERATURE: 97.5 F | OXYGEN SATURATION: 96 %

## 2023-04-26 DIAGNOSIS — N94.89 ADNEXAL MASS: ICD-10-CM

## 2023-04-26 LAB
ABO + RH BLD: NORMAL
D AG BLD QL: POSITIVE
GLUCOSE BLD-MCNC: 132 MG/DL (ref 70–99)
GLUCOSE BLD-MCNC: 153 MG/DL (ref 70–99)
LABORATORY COMMENT REPORT: NORMAL
POCT TEST: ABNORMAL
POCT TEST: ABNORMAL

## 2023-04-26 PROCEDURE — 0UT7FZZ RESECTION OF BILATERAL FALLOPIAN TUBES, VIA NATURAL OR ARTIFICIAL OPENING WITH PERCUTANEOUS ENDOSCOPIC ASSISTANCE: ICD-10-PCS | Performed by: STUDENT IN AN ORGANIZED HEALTH CARE EDUCATION/TRAINING PROGRAM

## 2023-04-26 PROCEDURE — 27200000 HC STERILE SUPPLY: Performed by: STUDENT IN AN ORGANIZED HEALTH CARE EDUCATION/TRAINING PROGRAM

## 2023-04-26 PROCEDURE — 8E0W4CZ ROBOTIC ASSISTED PROCEDURE OF TRUNK REGION, PERCUTANEOUS ENDOSCOPIC APPROACH: ICD-10-PCS | Performed by: STUDENT IN AN ORGANIZED HEALTH CARE EDUCATION/TRAINING PROGRAM

## 2023-04-26 PROCEDURE — 71000011 HC PACU PHASE 1 EA ADDL MIN: Performed by: STUDENT IN AN ORGANIZED HEALTH CARE EDUCATION/TRAINING PROGRAM

## 2023-04-26 PROCEDURE — 71000002 HC PACU PHASE 2 INITIAL 30MIN: Performed by: STUDENT IN AN ORGANIZED HEALTH CARE EDUCATION/TRAINING PROGRAM

## 2023-04-26 PROCEDURE — 63600000 HC DRUGS/DETAIL CODE: Performed by: STUDENT IN AN ORGANIZED HEALTH CARE EDUCATION/TRAINING PROGRAM

## 2023-04-26 PROCEDURE — 71000001 HC PACU PHASE 1 INITIAL 30MIN: Performed by: STUDENT IN AN ORGANIZED HEALTH CARE EDUCATION/TRAINING PROGRAM

## 2023-04-26 PROCEDURE — 36000004 HC OR LEVEL 4 INITIAL 30MIN: Performed by: STUDENT IN AN ORGANIZED HEALTH CARE EDUCATION/TRAINING PROGRAM

## 2023-04-26 PROCEDURE — 63600000 HC DRUGS/DETAIL CODE: Mod: JW | Performed by: ANESTHESIOLOGY

## 2023-04-26 PROCEDURE — 63700000 HC SELF-ADMINISTRABLE DRUG: Performed by: STUDENT IN AN ORGANIZED HEALTH CARE EDUCATION/TRAINING PROGRAM

## 2023-04-26 PROCEDURE — 0UT9FZZ RESECTION OF UTERUS, VIA NATURAL OR ARTIFICIAL OPENING WITH PERCUTANEOUS ENDOSCOPIC ASSISTANCE: ICD-10-PCS | Performed by: STUDENT IN AN ORGANIZED HEALTH CARE EDUCATION/TRAINING PROGRAM

## 2023-04-26 PROCEDURE — 71000012 HC PACU PHASE 2 EA ADDL MIN: Performed by: STUDENT IN AN ORGANIZED HEALTH CARE EDUCATION/TRAINING PROGRAM

## 2023-04-26 PROCEDURE — 37000001 HC ANESTHESIA GENERAL: Performed by: STUDENT IN AN ORGANIZED HEALTH CARE EDUCATION/TRAINING PROGRAM

## 2023-04-26 PROCEDURE — 25800000 HC PHARMACY IV SOLUTIONS: Performed by: STUDENT IN AN ORGANIZED HEALTH CARE EDUCATION/TRAINING PROGRAM

## 2023-04-26 PROCEDURE — 0UT2FZZ RESECTION OF BILATERAL OVARIES, VIA NATURAL OR ARTIFICIAL OPENING WITH PERCUTANEOUS ENDOSCOPIC ASSISTANCE: ICD-10-PCS | Performed by: STUDENT IN AN ORGANIZED HEALTH CARE EDUCATION/TRAINING PROGRAM

## 2023-04-26 PROCEDURE — 88332 PATH CONSLTJ SURG EA ADD BLK: CPT | Performed by: PATHOLOGY

## 2023-04-26 PROCEDURE — 88341 IMHCHEM/IMCYTCHM EA ADD ANTB: CPT | Mod: 59 | Performed by: STUDENT IN AN ORGANIZED HEALTH CARE EDUCATION/TRAINING PROGRAM

## 2023-04-26 PROCEDURE — 25000000 HC PHARMACY GENERAL

## 2023-04-26 PROCEDURE — 36000014 HC OR LEVEL 4 EA ADDL MIN: Performed by: STUDENT IN AN ORGANIZED HEALTH CARE EDUCATION/TRAINING PROGRAM

## 2023-04-26 PROCEDURE — 63600000 HC DRUGS/DETAIL CODE

## 2023-04-26 PROCEDURE — 25000000 HC PHARMACY GENERAL: Performed by: STUDENT IN AN ORGANIZED HEALTH CARE EDUCATION/TRAINING PROGRAM

## 2023-04-26 PROCEDURE — 63600000 HC DRUGS/DETAIL CODE: Performed by: ANESTHESIOLOGY

## 2023-04-26 PROCEDURE — 58571 TLH W/T/O 250 G OR LESS: CPT | Performed by: STUDENT IN AN ORGANIZED HEALTH CARE EDUCATION/TRAINING PROGRAM

## 2023-04-26 PROCEDURE — 25000000 HC PHARMACY GENERAL: Performed by: ANESTHESIOLOGY

## 2023-04-26 PROCEDURE — 88112 CYTOPATH CELL ENHANCE TECH: CPT | Performed by: STUDENT IN AN ORGANIZED HEALTH CARE EDUCATION/TRAINING PROGRAM

## 2023-04-26 PROCEDURE — 36415 COLL VENOUS BLD VENIPUNCTURE: CPT | Performed by: STUDENT IN AN ORGANIZED HEALTH CARE EDUCATION/TRAINING PROGRAM

## 2023-04-26 RX ORDER — HYDROMORPHONE HYDROCHLORIDE 1 MG/ML
INJECTION, SOLUTION INTRAMUSCULAR; INTRAVENOUS; SUBCUTANEOUS AS NEEDED
Status: DISCONTINUED | OUTPATIENT
Start: 2023-04-26 | End: 2023-04-26 | Stop reason: SURG

## 2023-04-26 RX ORDER — PHENYLEPHRINE HCL IN 0.9% NACL 50MG/250ML
10-200 PLASTIC BAG, INJECTION (ML) INTRAVENOUS
Status: DISCONTINUED | OUTPATIENT
Start: 2023-04-26 | End: 2023-04-26

## 2023-04-26 RX ORDER — ACETAMINOPHEN 325 MG/1
975 TABLET ORAL EVERY 6 HOURS PRN
Status: DISCONTINUED | OUTPATIENT
Start: 2023-04-26 | End: 2023-04-27 | Stop reason: HOSPADM

## 2023-04-26 RX ORDER — DEXAMETHASONE SODIUM PHOSPHATE 4 MG/ML
INJECTION, SOLUTION INTRA-ARTICULAR; INTRALESIONAL; INTRAMUSCULAR; INTRAVENOUS; SOFT TISSUE AS NEEDED
Status: DISCONTINUED | OUTPATIENT
Start: 2023-04-26 | End: 2023-04-26 | Stop reason: SURG

## 2023-04-26 RX ORDER — BUPIVACAINE HYDROCHLORIDE 2.5 MG/ML
INJECTION, SOLUTION EPIDURAL; INFILTRATION; INTRACAUDAL
Status: DISCONTINUED | OUTPATIENT
Start: 2023-04-26 | End: 2023-04-26 | Stop reason: HOSPADM

## 2023-04-26 RX ORDER — DEXTROSE 50 % IN WATER (D50W) INTRAVENOUS SYRINGE
25 AS NEEDED
Status: DISCONTINUED | OUTPATIENT
Start: 2023-04-26 | End: 2023-04-27 | Stop reason: HOSPADM

## 2023-04-26 RX ORDER — IBUPROFEN 200 MG
16-32 TABLET ORAL AS NEEDED
Status: DISCONTINUED | OUTPATIENT
Start: 2023-04-26 | End: 2023-04-26 | Stop reason: HOSPADM

## 2023-04-26 RX ORDER — OXYCODONE HYDROCHLORIDE 5 MG/1
5 TABLET ORAL ONCE AS NEEDED
Status: DISCONTINUED | OUTPATIENT
Start: 2023-04-26 | End: 2023-04-27 | Stop reason: HOSPADM

## 2023-04-26 RX ORDER — FENTANYL CITRATE 50 UG/ML
INJECTION, SOLUTION INTRAMUSCULAR; INTRAVENOUS AS NEEDED
Status: DISCONTINUED | OUTPATIENT
Start: 2023-04-26 | End: 2023-04-26 | Stop reason: SURG

## 2023-04-26 RX ORDER — DEXTROSE 40 %
15-30 GEL (GRAM) ORAL AS NEEDED
Status: DISCONTINUED | OUTPATIENT
Start: 2023-04-26 | End: 2023-04-27 | Stop reason: HOSPADM

## 2023-04-26 RX ORDER — LIDOCAINE HYDROCHLORIDE ANHYDROUS AND DEXTROSE MONOHYDRATE .8; 5 G/100ML; G/100ML
INJECTION, SOLUTION INTRAVENOUS CONTINUOUS PRN
Status: DISCONTINUED | OUTPATIENT
Start: 2023-04-26 | End: 2023-04-26 | Stop reason: SURG

## 2023-04-26 RX ORDER — ONDANSETRON 4 MG/1
4 TABLET, ORALLY DISINTEGRATING ORAL EVERY 8 HOURS PRN
Status: DISCONTINUED | OUTPATIENT
Start: 2023-04-26 | End: 2023-04-27 | Stop reason: HOSPADM

## 2023-04-26 RX ORDER — ONDANSETRON HYDROCHLORIDE 2 MG/ML
INJECTION, SOLUTION INTRAVENOUS AS NEEDED
Status: DISCONTINUED | OUTPATIENT
Start: 2023-04-26 | End: 2023-04-26 | Stop reason: SURG

## 2023-04-26 RX ORDER — ACETAMINOPHEN 325 MG/1
975 TABLET ORAL
Status: COMPLETED | OUTPATIENT
Start: 2023-04-26 | End: 2023-04-26

## 2023-04-26 RX ORDER — SODIUM CHLORIDE, SODIUM GLUCONATE, SODIUM ACETATE, POTASSIUM CHLORIDE AND MAGNESIUM CHLORIDE 30; 37; 368; 526; 502 MG/100ML; MG/100ML; MG/100ML; MG/100ML; MG/100ML
80 INJECTION, SOLUTION INTRAVENOUS CONTINUOUS
Status: DISCONTINUED | OUTPATIENT
Start: 2023-04-26 | End: 2023-04-27 | Stop reason: HOSPADM

## 2023-04-26 RX ORDER — PHENYLEPHRINE HCL IN 0.9% NACL 50MG/250ML
10-200 PLASTIC BAG, INJECTION (ML) INTRAVENOUS
Status: DISCONTINUED | OUTPATIENT
Start: 2023-04-26 | End: 2023-04-26 | Stop reason: HOSPADM

## 2023-04-26 RX ORDER — MIDAZOLAM HYDROCHLORIDE 2 MG/2ML
INJECTION, SOLUTION INTRAMUSCULAR; INTRAVENOUS AS NEEDED
Status: DISCONTINUED | OUTPATIENT
Start: 2023-04-26 | End: 2023-04-26 | Stop reason: SURG

## 2023-04-26 RX ORDER — ROCURONIUM BROMIDE 10 MG/ML
INJECTION, SOLUTION INTRAVENOUS AS NEEDED
Status: DISCONTINUED | OUTPATIENT
Start: 2023-04-26 | End: 2023-04-26 | Stop reason: SURG

## 2023-04-26 RX ORDER — LABETALOL HCL 20 MG/4 ML
5 SYRINGE (ML) INTRAVENOUS AS NEEDED
Status: DISCONTINUED | OUTPATIENT
Start: 2023-04-26 | End: 2023-04-26 | Stop reason: HOSPADM

## 2023-04-26 RX ORDER — IBUPROFEN 200 MG
16-32 TABLET ORAL AS NEEDED
Status: DISCONTINUED | OUTPATIENT
Start: 2023-04-26 | End: 2023-04-27 | Stop reason: HOSPADM

## 2023-04-26 RX ORDER — DEXTROSE 50 % IN WATER (D50W) INTRAVENOUS SYRINGE
25 AS NEEDED
Status: DISCONTINUED | OUTPATIENT
Start: 2023-04-26 | End: 2023-04-26 | Stop reason: HOSPADM

## 2023-04-26 RX ORDER — PROPOFOL 10 MG/ML
INJECTION, EMULSION INTRAVENOUS AS NEEDED
Status: DISCONTINUED | OUTPATIENT
Start: 2023-04-26 | End: 2023-04-26 | Stop reason: SURG

## 2023-04-26 RX ORDER — LIDOCAINE HYDROCHLORIDE 10 MG/ML
INJECTION, SOLUTION INFILTRATION; PERINEURAL AS NEEDED
Status: DISCONTINUED | OUTPATIENT
Start: 2023-04-26 | End: 2023-04-26 | Stop reason: SURG

## 2023-04-26 RX ORDER — TRAMADOL HYDROCHLORIDE 50 MG/1
50 TABLET ORAL
Status: COMPLETED | OUTPATIENT
Start: 2023-04-26 | End: 2023-04-26

## 2023-04-26 RX ORDER — OXYCODONE HYDROCHLORIDE 5 MG/1
5 TABLET ORAL EVERY 4 HOURS PRN
Status: DISCONTINUED | OUTPATIENT
Start: 2023-04-26 | End: 2023-04-27 | Stop reason: HOSPADM

## 2023-04-26 RX ORDER — CELECOXIB 200 MG/1
200 CAPSULE ORAL
Status: COMPLETED | OUTPATIENT
Start: 2023-04-26 | End: 2023-04-26

## 2023-04-26 RX ORDER — KETAMINE HYDROCHLORIDE 10 MG/ML
INJECTION, SOLUTION INTRAMUSCULAR; INTRAVENOUS AS NEEDED
Status: DISCONTINUED | OUTPATIENT
Start: 2023-04-26 | End: 2023-04-26 | Stop reason: SURG

## 2023-04-26 RX ORDER — FENTANYL CITRATE 50 UG/ML
50 INJECTION, SOLUTION INTRAMUSCULAR; INTRAVENOUS EVERY 5 MIN PRN
Status: DISCONTINUED | OUTPATIENT
Start: 2023-04-26 | End: 2023-04-26 | Stop reason: HOSPADM

## 2023-04-26 RX ORDER — HYDROMORPHONE HYDROCHLORIDE 1 MG/ML
0.5 INJECTION, SOLUTION INTRAMUSCULAR; INTRAVENOUS; SUBCUTANEOUS
Status: DISCONTINUED | OUTPATIENT
Start: 2023-04-26 | End: 2023-04-26 | Stop reason: HOSPADM

## 2023-04-26 RX ORDER — ONDANSETRON HYDROCHLORIDE 2 MG/ML
4 INJECTION, SOLUTION INTRAVENOUS
Status: DISCONTINUED | OUTPATIENT
Start: 2023-04-26 | End: 2023-04-26 | Stop reason: HOSPADM

## 2023-04-26 RX ORDER — DEXTROSE 40 %
15-30 GEL (GRAM) ORAL AS NEEDED
Status: DISCONTINUED | OUTPATIENT
Start: 2023-04-26 | End: 2023-04-26 | Stop reason: HOSPADM

## 2023-04-26 RX ORDER — KETOROLAC TROMETHAMINE 15 MG/ML
INJECTION, SOLUTION INTRAMUSCULAR; INTRAVENOUS AS NEEDED
Status: DISCONTINUED | OUTPATIENT
Start: 2023-04-26 | End: 2023-04-26 | Stop reason: SURG

## 2023-04-26 RX ADMIN — ONDANSETRON HYDROCHLORIDE 4 MG: 2 SOLUTION INTRAMUSCULAR; INTRAVENOUS at 09:59

## 2023-04-26 RX ADMIN — ACETAMINOPHEN 975 MG: 325 TABLET ORAL at 06:56

## 2023-04-26 RX ADMIN — CEFAZOLIN 2 G: 2 INJECTION, POWDER, FOR SOLUTION INTRAMUSCULAR; INTRAVENOUS at 07:50

## 2023-04-26 RX ADMIN — CELECOXIB 200 MG: 200 CAPSULE ORAL at 06:56

## 2023-04-26 RX ADMIN — ROCURONIUM BROMIDE 20 MG: 10 INJECTION, SOLUTION INTRAVENOUS at 09:00

## 2023-04-26 RX ADMIN — FENTANYL CITRATE 50 MCG: 50 INJECTION, SOLUTION INTRAMUSCULAR; INTRAVENOUS at 07:50

## 2023-04-26 RX ADMIN — ROCURONIUM BROMIDE 100 MG: 10 INJECTION, SOLUTION INTRAVENOUS at 07:41

## 2023-04-26 RX ADMIN — SUGAMMADEX 300 MG: 100 INJECTION, SOLUTION INTRAVENOUS at 10:12

## 2023-04-26 RX ADMIN — TRAMADOL HYDROCHLORIDE 50 MG: 50 TABLET, FILM COATED ORAL at 06:56

## 2023-04-26 RX ADMIN — PROPOFOL 150 MG: 10 INJECTION, EMULSION INTRAVENOUS at 07:40

## 2023-04-26 RX ADMIN — KETOROLAC TROMETHAMINE 15 MG: 15 INJECTION, SOLUTION INTRAMUSCULAR; INTRAVENOUS at 09:53

## 2023-04-26 RX ADMIN — HYDROMORPHONE HYDROCHLORIDE 0.5 MG: 1 INJECTION, SOLUTION INTRAMUSCULAR; INTRAVENOUS; SUBCUTANEOUS at 08:50

## 2023-04-26 RX ADMIN — MIDAZOLAM HYDROCHLORIDE 1 MG: 1 INJECTION, SOLUTION INTRAMUSCULAR; INTRAVENOUS at 07:29

## 2023-04-26 RX ADMIN — FENTANYL CITRATE 25 MCG: 50 INJECTION, SOLUTION INTRAMUSCULAR; INTRAVENOUS at 08:00

## 2023-04-26 RX ADMIN — MIDAZOLAM HYDROCHLORIDE 1 MG: 1 INJECTION, SOLUTION INTRAMUSCULAR; INTRAVENOUS at 07:40

## 2023-04-26 RX ADMIN — FENTANYL CITRATE 50 MCG: 50 INJECTION, SOLUTION INTRAMUSCULAR; INTRAVENOUS at 10:44

## 2023-04-26 RX ADMIN — SODIUM CHLORIDE, SODIUM GLUCONATE, SODIUM ACETATE, POTASSIUM CHLORIDE AND MAGNESIUM CHLORIDE 80 ML/HR: 526; 502; 368; 37; 30 INJECTION, SOLUTION INTRAVENOUS at 07:12

## 2023-04-26 RX ADMIN — DEXAMETHASONE SODIUM PHOSPHATE 4 MG: 4 INJECTION, SOLUTION INTRAMUSCULAR; INTRAVENOUS at 07:50

## 2023-04-26 RX ADMIN — SODIUM CHLORIDE, SODIUM GLUCONATE, SODIUM ACETATE, POTASSIUM CHLORIDE AND MAGNESIUM CHLORIDE: 526; 502; 368; 37; 30 INJECTION, SOLUTION INTRAVENOUS at 07:29

## 2023-04-26 RX ADMIN — Medication 50 MG: at 07:40

## 2023-04-26 RX ADMIN — LIDOCAINE HYDROCHLORIDE 1 MG/MIN: 8 INJECTION, SOLUTION INTRAVENOUS at 07:46

## 2023-04-26 RX ADMIN — ONDANSETRON HYDROCHLORIDE 4 MG: 2 SOLUTION INTRAMUSCULAR; INTRAVENOUS at 07:50

## 2023-04-26 RX ADMIN — LIDOCAINE HYDROCHLORIDE 10 ML: 10 INJECTION, SOLUTION INFILTRATION; PERINEURAL at 07:39

## 2023-04-26 RX ADMIN — FENTANYL CITRATE 25 MCG: 50 INJECTION, SOLUTION INTRAMUSCULAR; INTRAVENOUS at 08:42

## 2023-04-26 NOTE — OR SURGEON
Pre-Procedure patient identification:  I am the primary operating surgeon/proceduralist and I have identified the patient on   4/26/2023 at 7:15AM  Cam MAXWELL MD  Phone Number: [phone]

## 2023-04-26 NOTE — OP NOTE
Robotic XI total laparoscopic hysterectomy, bilateral salpingo-oophorectomy, possible staging, cystoscopy Procedure Note     Procedure:    Robotic XI total laparoscopic hysterectomy, bilateral salpingo-oophorectomy, possible staging, cystoscopy  CPT(R) Code:  12417 - SC LAPAROSCOPY W TOT HYSTERECTUTERUS <=250 GRAM  W TUBE/OVARY      Surgeon: Lissa Levy MD     Assistants: Duran Garcia DO    Anesthesia: General endotracheal anesthesia    ASA Class: 3    Pre-op Diagnosis: Left adnexal mass, post menopausal bleeding    Post-op Diagnosis: same    Total IV Fluids: 600mL crystalloid    Estimated Blood Loss:  20 mL           Urine Output: 200mL    Pre-incision antibiotics: 2gm cefazolin    VTE Prophylaxis: SCDs and 5000 units of subcutaneous heparin           Specimens:   ID Type Source Tests Collected by Time Destination   1 : pelvic washings Wash Pelvic Washings CYTOLOGY, NON-GYN ([facility]) Lissa Levy MD 4/26/2023 0855    2 : Uterus, Cervix, Tubes, and Ovaries - FREEZE LEFT OVARY AND ENDOMETRIUM Tissue Uterus/Tubes/Ovaries PATHOLOGY TISSUE EXAM Lissa Levy MD 4/26/2023 0914               Implants: none           Complications:  none           Disposition: PACU - hemodynamically stable.           Condition: stable    Findings: Exam under anesthesia revealed a normal size uterus. The adnexa were non palpable. Intra-operatively, the upper abdomen was grossly normal. The pelvis was a large solid white left adnexal mass. At the end of the procedure, cystoscopy was performed and a bubble was noted at the dome of the bladder, no stiches or defects were seen in the bladder wall and bilateral urine jets were seen from the ureteral orifices.     Indications: The patient is a 58 y.o. yo lady who presented with pelvic spotting and found to have a 6cm solid left adnexal mass.     Procedure Details   The patient was taken to the OR and anesthetized. Her arms were carefully padded and tucked, and legs were placed in lithotomy  with Anthony stirrups. She was prepped and draped in normal sterile fashion. A time-out was performed. Pre-incision antibiotics were administered. A Jenkins was placed. A salazar retractor and nafisa were placed into the vagina. The anterior lip of the cervix was grasped with a single tooth tenaculum. The cervix was then dilated to accommodate a medium V-Care. The medium v-care was then placed and all other instruments were removed. Gloves were changed.      The supra-umbilical area was injected with 0.25% bupivacaine. The skin was incised with a scalpel. The abdomen was tented up with towel clamps and entered using the Veress needle technique. Opening pressure was 3mmHg. The abdomen was insufflated with CO2 to a peak pressure of 15mmHg. After completion of insufflation, an 8mm robotic trocar was inserted into the abdomen. The robotic camera was then inserted and in spection revealed no evidence of entry injury. The upper abdomen was noted to be  grossly normal. The patient was placed into Trendelenburg position. The pelvis appeared notable for the left adnexal mass.       3 additional robotic trocars and a 5mm assistant trocar were placed under direct visualization, after injecting the same local anesthetic. The robot was then docked. Pelvic washings were obtained. The round ligaments were transected with monopolar cautery. The retroperitoneum was opened lateral to the infundibulopelvic ligaments on both sides. The course of the ureters were identified in the retroperitoneum. The vesico-uterine peritoneum was dissected towards the midline.    The infundibulopelvic ligaments were skeletonized away from the ureters. A window was made in between the infundibulopelvic ligament and the ureter using monopolar electrocautery, then secured with bipolar cautery, then transected. The posterior leaf was dissected to the level of the v-care cup. The vesico-uterine peritoneum was then further dissected and the bladder flap was created  over the cervix. The uterine arteries were skeletonized and secured with bipolar cautery and transected. The cardinal ligaments were taken down in similar fashion. Posterior colpotomy was made over the manipulator cup and carried around circumferentially. The specimen was detached and removed from the vagina intact. The vaginal cuff was closed with 2-0 V-lock suture in running fashion. All pedicles were hemostatic. Surgiflo was prophylactically applied. The robot was undocked.     The Jenkins was removed and the bladder was back-filled with sterile water. Bladder survey revealed no evidence of bladder injury or foreign materials. Brisk jetting of urine was seen from bilateral ureteral orifices. The bladder was drained. The Jenkins was not replaced.    The abdomen was desufflated and the trocars were removed. Skin incisions were closed with 4-0 monocryl. Dermabond was applied.     All counts were correct. The patient was awakened and taken to the PACU.      Lissa Levy MD

## 2023-04-26 NOTE — ANESTHESIA POSTPROCEDURE EVALUATION
Patient: Laurence Chang    Procedure Summary     Date: 04/26/23 Room / Location: [facility] OR [facility] / [facility]    Anesthesia Start: 0729 Anesthesia Stop:     Procedure: Robotic XI total laparoscopic hysterectomy, bilateral salpingo-oophorectomy, possible staging, cystoscopy Diagnosis:       Adnexal mass      (Adnexal mass [N94.89])    Surgeons: Lissa Levy MD Responsible Provider: Jasen Lorenzana MD    Anesthesia Type: general ASA Status: 3          Anesthesia Type: general  PACU Vitals    No data found in the last 10 encounters.           Anesthesia Post Evaluation    Mode of pain management: IV medication  Patient location during evaluation: PACU  Patient participation: complete - patient participated  Level of consciousness: awake and alert  Cardiovascular status: acceptable  Airway Patency: patent  Respiratory status: acceptable, spontaneous ventilation, unassisted and nasal cannula  Hydration status: stable  Anesthetic complications: no  Comments: Full report to PACU RN.  Vitals stable.  Patent airway, good spontaneous respirations.

## 2023-04-26 NOTE — DISCHARGE INSTRUCTIONS
[facility]  Robotic Surgery  Discharge Instructions          What to Expect    Although your surgery was done laparoscopically, it is still surgery and may take some time to recover. Please resume activities slowly.    It is normal for one incision to be slighter larger and sometimes more painful than the rest.     Most patient will have some vaginal spotting intermittently.  This may not begin right away, and may vary in amount from day-to-day.  As long as it is not heavy enough to saturate a pad every hour, it is normal.  Use a panty liner, not a tampon.    Most patients do not need prescription pain medication at home after laparoscopic surgery.  To minimize pain, for the first 3 days after your discharge, continue to take acetaminophen (Tylenol) and ibuprofen (Motrin or Advil) every 6 hours as directed on your discharge medication list.  This is 3 tablets of regular strength acetaminophen, which is tylenol (325 mg each), and 3 tablets of regular strength ibuprofen, which is motrin or advil (200 mg each).  After the 3 days are up, you can continue to take these medications every 6 hours as needed.  You may also put an ice pack over the incision if you would like.    Try to drink plenty of fluids.  We recommend taking one capful of Miralax daily and a  stool softener (senakot) until you're having normal soft bowel movements.    Activity  Do not lift more than 25 lbs for 2 weeks from the time of surgery.    Climbing stairs is perfectly ok. If you feel unsteady, hold onto a wall or railing for balance. Gradually increase your physical activity as tolerated. If you're feeling well at 2 weeks, you may resume light exercises such as jogging or elliptical machines. Avoid exercises that require a lot of core work, such as pilates, abdominal crunches, cross-fit, for 4 weeks.    If you have had a hysterectomy performed no tampons, douching or sexual intercourse until you’re examined and given the  approval from the doctor (usually around 6-8 weeks from surgery).    You may resume driving when you are pain-free enough to react quickly with your braking foot. For most patients this occurs at 1 week following surgery.    You may shower normally.  The incisions usually have dissolvable sutures or glue at the incision sites. Please keep clean and dry. Just pat the area dry after a shower. Skin glue should be peeled off in one week, remove it just as you would a band-aid. A small amount of redness at the skin edge is normal and a small amount of bloody or clear discharge can be expected.      PLEASE CALL THE OFFICE IF YOU EXPERIENCE ANY OF THE FOLLOWING:  - Heavy vaginal bleeding (soaking through one maxi-pad an hour)  - Fever greater than 101 F.   - Worsening pain or pain not relieved by pain medications.  - Constipation not relieved by laxatives.  - Persistent nausea or vomiting or any other concerns.

## 2023-04-26 NOTE — H&P
"Admitting Diagnosis: Adnexal mass [N94.89]  HPI     Ms. Laurence Chang is a 58 y.o. yo lady  with PMH CHF, MI, CVA, DM (latest A1C=2022), hashimoto's, HTN, HLD, CKD,  who is referred by Dr. Mcgee. Pt reports intermittent light spotting for years. Had embx 2020 per records, but I do not have these results. Latest w/up with Pelvic US showing a normal EMS, 4ml, but 6.0 cm left adnexal mass and small amount of free pelvic fluid. EMBx performed 23 is suboptimal with scant superficial stripes of inactive endometrium. She had an MRI 23 to further evaluate mass, re demonstrating 6.4 x4.3cm solid left adnexal mass with heterogenous centrally enhancing area. Per report, it could represent an adenomyoma, but neoplasm cannot be excluded. The uterus measures 4.5 x 3.8x 8.8cm with thickened anterior cervical wall. Has not had spotting for a few wks.      Latest PAP smear NIL 2023. HPV testing not performed.      Recent weight loss of 10-12 lbs over past 4 mos since starting Ozempic for DM. Relates to early satiety for yrs- states she was \"born with a small stomach\". One episode pelvic/abdominal pain recently, self resolved. Constipation - manages with magnesium. Denies nausea vomiting. C/o urinary frequency- states prior urologist offered her a medication, but she declined. NELLI with cough/colds.      FH +ovarian cancer, MGM dx in her 80s. Mother +colon cancer in a polyp, 2 great aunts with breast cancer, dx >65y.o. Pt was told she should have genetic testing, but never completed.       Pt reports removal of a precancerous polyp during colonoscopy 6-7 yrs ago.      Hx MI , CVA (8-9 yrs ago). On 81mg ASA. No further blood thinners.  Her cardiologist Dr. Fried is retiring and switching to Dr. Khan in same practice. Reports she gets severe depression on beta blockers and many cardiac meds and difficulty tolerating. ? New arrithymia     She does not follow with nephrologist reports fluctuating kidney " "function.     Medical History:   Past Medical History:   Diagnosis Date   • CHF (congestive heart failure) (CMS/HCC)    • Coronary artery disease    • Depression    • GERD (gastroesophageal reflux disease)    • Hashimoto's disease    • Heart problem     \"Electrical heart problem\"   • History of loop recorder     subsequently removed   • Liver disease    • Macular degeneration    • Morbid obesity (CMS/HCC)    • Myocardial infarct (CMS/HCC)    • MENDES (nonalcoholic steatohepatitis)    • Sjogren syndrome with keratoconjunctivitis (CMS/HCC)    • Skin cancer     basal cell ca face   • Sleep apnea    • Stroke (CMS/HCC)    • Type 2 diabetes mellitus (CMS/HCC)        Surgical History:   Past Surgical History:   Procedure Laterality Date   • COLONOSCOPY W/ POLYPECTOMY     • CYSTECTOMY      mouth   • DENTAL SURGERY       OB History:# 1 - Date: None, Sex: None, Weight: None, GA: None, Delivery: Vaginal, Spontaneous, Apgar1: None, Apgar5: None, Living: None, Birth Comments: None    Social History:   Social History     Socioeconomic History   • Marital status:      Spouse name: None   • Number of children: 1   • Years of education: None   • Highest education level: None   Occupational History   • Occupation: Disability   Tobacco Use   • Smoking status: Never     Passive exposure: Past   • Smokeless tobacco: Never   Vaping Use   • Vaping status: Never Used   Substance and Sexual Activity   • Alcohol use: Never   • Drug use: Never   • Sexual activity: Defer   Social History Narrative    Lives with mother in a 2 story home     Social Determinants of Health     Food Insecurity: No Food Insecurity (2/11/2023)    Hunger Vital Sign    • Worried About Running Out of Food in the Last Year: Never true    • Ran Out of Food in the Last Year: Never true       Family History:   Family History   Problem Relation Age of Onset   • Colon cancer Biological Mother    • Hypertension Biological Father    • Stroke Biological Father    • Thyroid " "cancer Biological Father    • Skin cancer Biological Father    • AVM Biological Brother        Allergies: Amoxicillin, Beta-blockers (beta-adrenergic blocking agts), Clindamycin, Lisinopril, Soap, Statins-hmg-coa reductase inhibitors, Sulfa (sulfonamide antibiotics), and Latex       Medication List      CONTINUE taking these medications    ALCOHOL PREP PADS pads, medicated  3 (three) times a day.  Generic drug: alcohol swabs     BD GRACE 2ND GEN PEN NEEDLE 32 gauge x 5/32\" needle  USE 1 INJECTION 4 TIMES A DAY  Generic drug: pen needle, diabetic        ASK your doctor about these medications    amLODIPine 2.5 mg tablet  Commonly known as: NORVASC  Take 2.5 mg by mouth as needed. Take if B/P if above 140/85 in the PM  Dose: 2.5 mg     ARIPiprazole 5 mg tablet  Commonly known as: ABILIFY  Take 5 mg by mouth. 5 mg in am  5 mg at 3pm  20 mg at HS  Dose: 5 mg     * ARMOUR THYROID 30 mg tablet  daily with lunch. TAKE 1 TABLET DAILY IN THE AFTERNOON  Generic drug: thyroid (pork)     * ARMOUR THYROID 90 mg tablet  Take 1 tablet by mouth every morning. 30 mg at lunch  Dose: 1 tablet  Generic drug: thyroid (pork)     aspirin 81 mg enteric coated tablet  Take 81 mg by mouth nightly.  Dose: 81 mg     clonazePAM 0.5 mg tablet  Commonly known as: klonoPIN  Take 0.5 mg by mouth nightly.  Dose: 0.5 mg     gabapentin 300 mg capsule  Commonly known as: NEURONTIN  Take 300 mg by mouth. 400 mg in  Am  400 at noon  1000mg at HS  Dose: 300 mg     GEODON ORAL  Take 20 mg by mouth 3 (three) times a day. 20 mg in  Am  20 mg at 3pm   100 mg at HS  Dose: 20 mg     insulin aspart U-100 100 unit/mL (3 mL) pen  Commonly known as: NovoLOG  Inject under the skin. As needed 6 TID Plus sliding scale Units dosing     insulin degludec U-100 100 unit/mL (3 mL) pen  Commonly known as: TRESIBA FLEXTOUCH  Inject 25 Units under the skin nightly.  Dose: 25 Units     metFORMIN  mg 24 hr tablet  Commonly known as: GLUCOPHAGE-XR  Take 500 mg by mouth 2 " "(two) times a day with meals.  Dose: 500 mg     nystatin 100,000 unit/gram cream  Commonly known as: MYCOSTATIN  as needed.     OZEMPIC 2 mg/dose (8 mg/3 mL) subcutaneous injection  Inject under the skin once a week. Mondays  Generic drug: semaglutide     pantoprazole 20 mg EC tablet  Commonly known as: PROTONIX  Take 20 mg by mouth daily with dinner.  Dose: 20 mg     polyethylene glycol 17 gram/dose powder  Commonly known as: MIRALAX  Take 17 g by mouth as needed.  Dose: 17 g     REXULTI 4 mg tablet  Take 4 mg by mouth nightly.  Dose: 4 mg  Generic drug: brexpiprazole     tranylcypromine 10 mg tablet  Commonly known as: PARNATE  Take 20 mg by mouth 3 (three) times a day. \" Stopped for surgery  per anesthesiologist\"  Dose: 20 mg         * This list has 2 medication(s) that are the same as other medications prescribed for you. Read the directions carefully, and ask your doctor or other care provider to review them with you.              Review of Systems  All other systems reviewed and negative except as noted in the HPI.    Objective     Vital Signs for the last 24 hours:  Temp:  [36.6 °C (97.8 °F)] 36.6 °C (97.8 °F)  Heart Rate:  [107] 107  Resp:  [16] 16  BP: (173)/(69) 173/69    Physical Exam:  General: well-developed, well-nourished, no apparent distress  Neck: supple, no masses  Lymphatic: no supraclavicular, cervical, or inguinal adenopathy  Respiratory: lungs clear to auscultation bilaterally, normal respiratory effort  Cardiovascular: regular rate and rhythm, no murmurs, rubs, gallops.   Extremity: no clubbing, cyanosis, edema  GI: abdomen soft, non-distended, non-tender, no hepatosplenomegaly, no masses  Neurologic: alert & oriented x3, no gross deficits  Psychiatric: mood and affect normal  Musculoskeletal: no deformity or gross strength deficit  Gynecologic: defer to OR      Labs  I have reviewed the patient's labs.  Current labs are within normal limits.      Assessment/Plan   Adnexal mass - Primary      " Current Assessment & Plan       I personally reviewed the patient's imaging reports. I do not have the images unfortunately, however report says left adnexal mass is concerning for possible fibroma. This would clinically make sense as she is having post menopausal bleeding as well.  Explained the only way to definitively rule out malignancy is with a tissue diagnosis. Biopsy of the mass is contraindicated due to risk of seeding if the mass is malignant. Reviewed plan for laparoscopic evaluation with removal of abnormal tube and ovary. Explained the concept of frozen section. If the frozen is benign, plan would be for removal of both tubes and ovaries as well as a hysterectomy given her post menopausal bleeding. If malignant would proceed with staging with pelvic and para-aortic lymph node dissection and omentectomy. Given her BMI, will perform this procedure robotically. Discussed risk of laparotomy.      I personally discussed the risk benefits and alternatives of the surgery. The risks of the surgery include but are not limited to bleeding, blood transfusion, infection, VTE, injury to surrounding structures such as bowel bladder ureters and blood vessels, cardiac even including MI, stroke, blood clot. She is at increased risk due to her cardiac history, hx of stroke, DM and obesity. All questions were answered.         Given her significant medical hx, she needs to see her PCP, anesthesia, cardiology and her psychiatrist before safely proceeding. She is aware of this. If we are unable to proceed to the OR, will repeat imaging in 8 weeks.        Informed Consent Procedure     Indication for procedure: adnexal mass     Procedure: RA-TLH, BSO possible, PPALND, omentectomy, possible laparotomy.      Risks: as above     Alternatives: observation, biopsy     Risks of not having procedure: missing a cancer diagnosis or seeding possible cancer              Relevant Orders     Case request operating room: Robotic XI total  laparoscopic hysterectomy, bilateral salpingo-oophorectomy, possible staging, cystoscopy (Completed)     Type and Screen [facility]     CBC     Basic metabolic panel

## 2023-04-26 NOTE — ANESTHESIA PROCEDURE NOTES
Airway  Urgency: elective    Start Time: 4/26/2023 7:43 AM  Airway not difficult    General Information and Staff    Patient location during procedure: OR  Performed: resident/CRNA   Performed by: Juliann Marinelli CRNA  Authorized by: Jasen Lorenzana MD      Indications and Patient Condition  Indications for airway management: anesthesia  Sedation level: general  Preoxygenated: yes  Patient position: sniffing  Mask difficulty assessment: 2 - vent by mask + OA or adjuvant +/- NMBA    Final Airway Details  Final airway type: endotracheal airway      Successful airway: ETT  Cuffed: yes   Successful intubation technique: video laryngoscopy (glidescope)  Facilitating devices/methods: intubating stylet  Endotracheal tube insertion site: oral  Blade: Liudmila  Blade size: #3  ETT size (mm): 7.0  Cormack-Lehane Classification: grade I - full view of glottis  Placement verified by: chest auscultation and capnometry   Measured from: lips  ETT to lips (cm): 22  Number of attempts at approach: 1  Number of other approaches attempted: 0  Atraumatic airway insertion

## 2023-04-26 NOTE — ANESTHESIOLOGIST PRE-PROCEDURE ATTESTATION
Pre-Procedure Patient Identification:  I am the Primary Anesthesiologist and have identified the patient on 04/26/23 at 7:00 AM.   I have confirmed the procedure(s) will be performed by the following surgeon/proceduralist Lissa Levy MD.

## 2023-04-27 LAB
CASE RPRT: NORMAL
CLINICAL INFO: NORMAL
PATH REPORT.FINAL DX SPEC: NORMAL
PATH REPORT.GROSS SPEC: NORMAL

## 2023-04-28 ENCOUNTER — TELEPHONE (OUTPATIENT)
Dept: GYNECOLOGIC ONCOLOGY | Facility: CLINIC | Age: 58
End: 2023-04-28
Payer: MEDICARE

## 2023-04-28 NOTE — TELEPHONE ENCOUNTER
Patient is experiencing bruising around incisions and lower back pain. She wanted to be sure this was normal following recent procedure.

## 2023-05-02 LAB
CASE RPRT: NORMAL
CLINICAL INFO: NORMAL
IMMUNE STAIN STUDY: NORMAL
PATH REPORT.FINAL DX SPEC: NORMAL
PATH REPORT.FINAL DX SPEC: NORMAL
PATH REPORT.GROSS SPEC: NORMAL
PATH REPORT.INTRAOP OBS SPEC DOC: NORMAL
PATH REPORT.MICROSCOPIC SPEC OTHER STN: NORMAL

## 2023-05-16 ENCOUNTER — TELEPHONE (OUTPATIENT)
Dept: GYNECOLOGIC ONCOLOGY | Facility: CLINIC | Age: 58
End: 2023-05-16
Payer: MEDICARE

## 2023-05-16 NOTE — TELEPHONE ENCOUNTER
Pt called to schedule her post op - wanted the doctor to know that she is spotting and if that's normal after the procedure.

## 2023-05-16 NOTE — TELEPHONE ENCOUNTER
TC with pt - she is s/p robotic TLH, BSO 4/26/23. Reassured spotting is normal after this procedure. She was just concerned as had stopped then returned, but confirmed light spotting. Advised to call office if heavy bleeding, >/= 1 pad/hr. Postop scheduled 5/23

## 2023-05-22 NOTE — PROGRESS NOTES
"CC: post-op visit    HPI: Ms. Laurence Chang is post-op from [facility], BSO 4/26/23 for adnexal mass. Final pathology: benign thecoma     She returns today for post-op visit. Reports some occasional light spotting, denies pelvic/abdominal pain/bloating, denies change in bowel/bladder habits, denies other suspicious symptoms.    Working on better DM control. Reports recent visit with [facility], Dr. Muir. BSs in the upper 200s/low 300s per pt. Meds were adjusted. On Ozempic, insulin and metformin. Reports fatigue that she attributes to her thyroid function - hx hoshimotos on armour thyroid- also managed by endo.       Medical History:   Past Medical History:   Diagnosis Date   • CHF (congestive heart failure) (CMS/HCC)    • Coronary artery disease    • Depression    • GERD (gastroesophageal reflux disease)    • Hashimoto's disease    • Heart problem     \"Electrical heart problem\"   • History of loop recorder     subsequently removed   • Liver disease    • Macular degeneration    • Morbid obesity (CMS/HCC)    • Myocardial infarct (CMS/HCC)    • MENDES (nonalcoholic steatohepatitis)    • Sjogren syndrome with keratoconjunctivitis (CMS/HCC)    • Skin cancer     basal cell ca face   • Sleep apnea    • Stroke (CMS/HCC)    • Type 2 diabetes mellitus (CMS/HCC)        Surgical History:   Past Surgical History:   Procedure Laterality Date   • COLONOSCOPY W/ POLYPECTOMY     • CYSTECTOMY      mouth   • DENTAL SURGERY         Allergies: Amoxicillin, Beta-blockers (beta-adrenergic blocking agts), Clindamycin, Lisinopril, Soap, Statins-hmg-coa reductase inhibitors, Sulfa (sulfonamide antibiotics), and Latex    Medications:   Current Outpatient Medications   Medication Sig Dispense Refill   • ALCOHOL PREP PADS pads, medicated 3 (three) times a day.     • amLODIPine (NORVASC) 2.5 mg tablet Take 2.5 mg by mouth as needed. Take if B/P if above 140/85 in the PM     • ARIPiprazole (ABILIFY) 5 mg tablet Take 5 mg by mouth. 5 mg in am  5 mg " "at 3pm  20 mg at HS     • ARMOUR THYROID 30 mg tablet daily with lunch. TAKE 1 TABLET DAILY IN THE AFTERNOON     • ARMOUR THYROID 90 mg tablet Take 1 tablet by mouth every morning. 30 mg at lunch     • aspirin 81 mg enteric coated tablet Take 81 mg by mouth nightly.     • BD GRACE 2ND GEN PEN NEEDLE 32 gauge x 5/32\" needle USE 1 INJECTION 4 TIMES A DAY     • brexpiprazole (REXULTI) 4 mg tablet Take 4 mg by mouth nightly.     • clonazePAM (klonoPIN) 0.5 mg tablet Take 0.5 mg by mouth nightly.     • gabapentin (NEURONTIN) 300 mg capsule Take 300 mg by mouth. 400 mg in  Am  400 at noon  1000mg at HS     • insulin aspart U-100 (NovoLOG) 100 unit/mL (3 mL) pen Inject under the skin. As needed 6 TID Plus sliding scale Units dosing     • insulin degludec U-100 (TRESIBA FLEXTOUCH) 100 unit/mL (3 mL) pen Inject 25 Units under the skin nightly.     • metFORMIN XR (GLUCOPHAGE-XR) 500 mg 24 hr tablet Take 500 mg by mouth 2 (two) times a day with meals.     • nystatin (MYCOSTATIN) 100,000 unit/gram cream as needed.     • OZEMPIC 2 mg/dose (8 mg/3 mL) subcutaneous injection Inject under the skin once a week. Mondays     • pantoprazole (PROTONIX) 20 mg EC tablet Take 20 mg by mouth daily with dinner.     • polyethylene glycol (MIRALAX) 17 gram/dose powder Take 17 g by mouth as needed.     • tranylcypromine (PARNATE) 10 mg tablet Take 20 mg by mouth 3 (three) times a day. \" Stopped for surgery  per anesthesiologist\"     • ziprasidone HCl (GEODON ORAL) Take 20 mg by mouth 3 (three) times a day. 20 mg in  Am  20 mg at 3pm   100 mg at HS       No current facility-administered medications for this visit.       Vital Signs:  Visit Vitals  BP (!) 143/89 (BP Location: Right upper arm, Patient Position: Sitting)   Pulse (!) 123   Temp 36.4 °C (97.6 °F) (Oral)   Ht 1.473 m (4' 10\")   Wt 104 kg (230 lb)   SpO2 98%   BMI 48.07 kg/m²       Physical Exam:  General: well-developed, well-nourished, no apparent distress, breathing comfortably on " room air   GI: laparoscopic incisions with healthy granulation tissue, some separation noted, but scabbed over, no open wounds, slowly healing, no discharge or active bleeding   Gynecologic: normal external female genitalia.    Speculum: vaginal cuff visibly intact   Bimanual: vaginal cuff palpably intact    Pathology Results:  Uterus, Cervix, Tubes, and Ovaries:    Thecoma, measuring 8 cm in longest dimension, left ovary.  Cystic cervicitis.  Weakly proliferative endometrium with focal atypia  Right ovary with no pathologic change.  Bilateral fallopian tubes with no pathologic change.        Assessment/Plan     Ms. Laurence Chang is a 58 y.o. lady who is healing from surgery.     Problem List Items Addressed This Visit        Circulatory    Hypertension    Current Assessment & Plan     Continue following with cardio             Genitourinary    Adnexal mass - Primary    Current Assessment & Plan     Pt healing relatively well from surgery. No s/sx of infection. Reviewed pathology: benign thecoma. Continue to work on DM control and continue following with Endocrine as discussed poor DM control can prolong healing. Continue pelvic rest until 6 wks postop. Discussed some light spotting is normal until this time as she continues to heal. No f/up needed unless issues/concerns. She will continue following with her gyn for annual visits: mammograms/breast exams etc.             Endocrine/Metabolic    Diabetes mellitus (CMS/HCC)    Current Assessment & Plan     Continue following with JUDY Sweeney

## 2023-05-23 ENCOUNTER — OFFICE VISIT (OUTPATIENT)
Dept: GYNECOLOGIC ONCOLOGY | Facility: CLINIC | Age: 58
End: 2023-05-23
Attending: STUDENT IN AN ORGANIZED HEALTH CARE EDUCATION/TRAINING PROGRAM
Payer: MEDICARE

## 2023-05-23 VITALS
HEIGHT: 58 IN | TEMPERATURE: 97.6 F | DIASTOLIC BLOOD PRESSURE: 89 MMHG | WEIGHT: 230 LBS | HEART RATE: 123 BPM | OXYGEN SATURATION: 98 % | SYSTOLIC BLOOD PRESSURE: 143 MMHG | BODY MASS INDEX: 48.28 KG/M2

## 2023-05-23 DIAGNOSIS — E11.65 TYPE 2 DIABETES MELLITUS WITH HYPERGLYCEMIA, WITH LONG-TERM CURRENT USE OF INSULIN (CMS/HCC): ICD-10-CM

## 2023-05-23 DIAGNOSIS — I10 PRIMARY HYPERTENSION: ICD-10-CM

## 2023-05-23 DIAGNOSIS — Z79.4 TYPE 2 DIABETES MELLITUS WITH HYPERGLYCEMIA, WITH LONG-TERM CURRENT USE OF INSULIN (CMS/HCC): ICD-10-CM

## 2023-05-23 DIAGNOSIS — N94.89 ADNEXAL MASS: Primary | ICD-10-CM

## 2023-05-23 PROBLEM — E11.9 DIABETES MELLITUS (CMS/HCC): Status: ACTIVE | Noted: 2023-05-23

## 2023-05-23 PROCEDURE — 99024 POSTOP FOLLOW-UP VISIT: CPT | Performed by: STUDENT IN AN ORGANIZED HEALTH CARE EDUCATION/TRAINING PROGRAM

## 2023-05-23 NOTE — ASSESSMENT & PLAN NOTE
Pt healing relatively well from surgery. No s/sx of infection. Reviewed pathology: benign thecoma. Continue to work on DM control and continue following with Endocrine as discussed poor DM control can prolong healing. Continue pelvic rest until 6 wks postop. Discussed some light spotting is normal until this time as she continues to heal. No f/up needed unless issues/concerns. She will continue following with her gyn for annual visits: mammograms/breast exams etc.

## 2023-10-06 ENCOUNTER — TRANSCRIBE ORDERS (OUTPATIENT)
Dept: SCHEDULING | Age: 58
End: 2023-10-06

## 2023-10-06 DIAGNOSIS — I10 ESSENTIAL (PRIMARY) HYPERTENSION: ICD-10-CM

## 2023-10-06 DIAGNOSIS — I47.10 SUPRAVENTRICULAR TACHYCARDIA (CMS/HCC): ICD-10-CM

## 2023-10-06 DIAGNOSIS — E11.9 TYPE 2 DIABETES MELLITUS WITHOUT COMPLICATIONS (CMS/HCC): ICD-10-CM

## 2023-10-06 DIAGNOSIS — R07.2 PRECORDIAL PAIN: Primary | ICD-10-CM

## 2023-11-03 ENCOUNTER — APPOINTMENT (RX ONLY)
Dept: URBAN - METROPOLITAN AREA CLINIC 374 | Facility: CLINIC | Age: 58
Setting detail: DERMATOLOGY
End: 2023-11-03

## 2023-11-03 DIAGNOSIS — Z71.89 OTHER SPECIFIED COUNSELING: ICD-10-CM

## 2023-11-03 DIAGNOSIS — L98.1 FACTITIAL DERMATITIS: ICD-10-CM

## 2023-11-03 DIAGNOSIS — D18.0 HEMANGIOMA: ICD-10-CM

## 2023-11-03 DIAGNOSIS — D22 MELANOCYTIC NEVI: ICD-10-CM

## 2023-11-03 DIAGNOSIS — L81.4 OTHER MELANIN HYPERPIGMENTATION: ICD-10-CM

## 2023-11-03 DIAGNOSIS — L57.0 ACTINIC KERATOSIS: ICD-10-CM

## 2023-11-03 PROBLEM — D18.01 HEMANGIOMA OF SKIN AND SUBCUTANEOUS TISSUE: Status: ACTIVE | Noted: 2023-11-03

## 2023-11-03 PROBLEM — D22.5 MELANOCYTIC NEVI OF TRUNK: Status: ACTIVE | Noted: 2023-11-03

## 2023-11-03 PROBLEM — D22.62 MELANOCYTIC NEVI OF LEFT UPPER LIMB, INCLUDING SHOULDER: Status: ACTIVE | Noted: 2023-11-03

## 2023-11-03 PROBLEM — D22.71 MELANOCYTIC NEVI OF RIGHT LOWER LIMB, INCLUDING HIP: Status: ACTIVE | Noted: 2023-11-03

## 2023-11-03 PROBLEM — D22.61 MELANOCYTIC NEVI OF RIGHT UPPER LIMB, INCLUDING SHOULDER: Status: ACTIVE | Noted: 2023-11-03

## 2023-11-03 PROBLEM — D22.72 MELANOCYTIC NEVI OF LEFT LOWER LIMB, INCLUDING HIP: Status: ACTIVE | Noted: 2023-11-03

## 2023-11-03 PROCEDURE — ? SUNSCREEN RECOMMENDATIONS

## 2023-11-03 PROCEDURE — ? PRESCRIPTION

## 2023-11-03 PROCEDURE — 17000 DESTRUCT PREMALG LESION: CPT

## 2023-11-03 PROCEDURE — ? PREMALIGNANT DESTRUCTION

## 2023-11-03 PROCEDURE — ? PRESCRIPTION MEDICATION MANAGEMENT

## 2023-11-03 PROCEDURE — 99213 OFFICE O/P EST LOW 20 MIN: CPT | Mod: 25

## 2023-11-03 PROCEDURE — ? DEFER

## 2023-11-03 PROCEDURE — ? COUNSELING

## 2023-11-03 PROCEDURE — ? FULL BODY SKIN EXAM

## 2023-11-03 RX ORDER — OSTOMY SUPPLY 1"
EACH MISCELLANEOUS
Qty: 20 | Refills: 0 | Status: ERX | COMMUNITY
Start: 2023-11-03

## 2023-11-03 RX ADMIN — Medication: at 00:00

## 2023-11-03 ASSESSMENT — LOCATION SIMPLE DESCRIPTION DERM
LOCATION SIMPLE: LEFT CHEEK
LOCATION SIMPLE: RIGHT THIGH
LOCATION SIMPLE: LEFT THIGH
LOCATION SIMPLE: LEFT POSTERIOR UPPER ARM
LOCATION SIMPLE: RIGHT SHOULDER
LOCATION SIMPLE: RIGHT CALF
LOCATION SIMPLE: LOWER BACK
LOCATION SIMPLE: UPPER BACK
LOCATION SIMPLE: CHEST
LOCATION SIMPLE: LEFT HAND
LOCATION SIMPLE: LEFT UPPER BACK
LOCATION SIMPLE: RIGHT FOREARM
LOCATION SIMPLE: ABDOMEN
LOCATION SIMPLE: LEFT CALF
LOCATION SIMPLE: RIGHT UPPER ARM
LOCATION SIMPLE: LEFT POSTERIOR THIGH
LOCATION SIMPLE: RIGHT POSTERIOR THIGH
LOCATION SIMPLE: LEFT SHOULDER
LOCATION SIMPLE: RIGHT PRETIBIAL REGION
LOCATION SIMPLE: HAIR
LOCATION SIMPLE: LEFT PRETIBIAL REGION
LOCATION SIMPLE: LEFT FOREARM
LOCATION SIMPLE: RIGHT POSTERIOR UPPER ARM
LOCATION SIMPLE: LEFT UPPER ARM

## 2023-11-03 ASSESSMENT — LOCATION DETAILED DESCRIPTION DERM
LOCATION DETAILED: LEFT DISTAL POSTERIOR THIGH
LOCATION DETAILED: LEFT DISTAL PRETIBIAL REGION
LOCATION DETAILED: LEFT ANTERIOR PROXIMAL UPPER ARM
LOCATION DETAILED: INFERIOR THORACIC SPINE
LOCATION DETAILED: RIGHT DISTAL PRETIBIAL REGION
LOCATION DETAILED: 2ND WEB SPACE LEFT HAND
LOCATION DETAILED: LEFT SUPERIOR MEDIAL UPPER BACK
LOCATION DETAILED: RIGHT DISTAL POSTERIOR THIGH
LOCATION DETAILED: RIGHT ANTERIOR PROXIMAL UPPER ARM
LOCATION DETAILED: RIGHT PROXIMAL CALF
LOCATION DETAILED: RIGHT ANTERIOR DISTAL UPPER ARM
LOCATION DETAILED: LEFT ANTERIOR DISTAL THIGH
LOCATION DETAILED: SUPERIOR LUMBAR SPINE
LOCATION DETAILED: LEFT POSTERIOR SHOULDER
LOCATION DETAILED: RIGHT ANTERIOR DISTAL THIGH
LOCATION DETAILED: LEFT PROXIMAL POSTERIOR UPPER ARM
LOCATION DETAILED: EPIGASTRIC SKIN
LOCATION DETAILED: LEFT DISTAL DORSAL FOREARM
LOCATION DETAILED: HAIR
LOCATION DETAILED: LEFT PROXIMAL CALF
LOCATION DETAILED: LEFT ANTERIOR PROXIMAL THIGH
LOCATION DETAILED: RIGHT PROXIMAL DORSAL FOREARM
LOCATION DETAILED: RIGHT PROXIMAL POSTERIOR UPPER ARM
LOCATION DETAILED: RIGHT MEDIAL SUPERIOR CHEST
LOCATION DETAILED: LEFT SUPERIOR LATERAL MALAR CHEEK
LOCATION DETAILED: RIGHT POSTERIOR SHOULDER

## 2023-11-03 ASSESSMENT — LOCATION ZONE DERM
LOCATION ZONE: SCALP
LOCATION ZONE: LEG
LOCATION ZONE: ARM
LOCATION ZONE: TRUNK
LOCATION ZONE: HAND
LOCATION ZONE: FACE

## 2023-11-03 NOTE — PROCEDURE: PREMALIGNANT DESTRUCTION
Anesthesia Volume In Cc: 0.5
Detail Level: Zone
Render Note In Bullet Format When Appropriate: No
Post-Care Instructions: I reviewed with the patient in detail post-care instructions. Patient is to wear sunprotection, and avoid picking at any of the treated lesions. Pt may apply Vaseline to crusted or scabbing areas.
Consent: The patient's consent was obtained including but not limited to risks of crusting, scabbing, blistering, scarring, darker or lighter pigmentary change, recurrence, incomplete removal and infection.
Method: liquid nitrogen

## 2023-11-03 NOTE — PROCEDURE: DEFER
X Size Of Lesion In Cm (Optional): 0
Introduction Text (Please End With A Colon): The following procedure was deferred:
Detail Level: Zone
Size Of Lesion In Cm (Optional): 0.4

## 2023-11-03 NOTE — PROCEDURE: PRESCRIPTION MEDICATION MANAGEMENT
Render In Strict Bullet Format?: No
Initiate Treatment: DuoDERM CGF Extra Thin Spots 1 3/4\" X 1 1/2\" bandage Apply to healing lesions QDAY
Detail Level: Zone

## 2023-11-30 ENCOUNTER — APPOINTMENT (RX ONLY)
Dept: URBAN - METROPOLITAN AREA CLINIC 374 | Facility: CLINIC | Age: 58
Setting detail: DERMATOLOGY
End: 2023-11-30

## 2023-11-30 DIAGNOSIS — L57.0 ACTINIC KERATOSIS: ICD-10-CM | Status: INADEQUATELY CONTROLLED

## 2023-11-30 DIAGNOSIS — L81.4 OTHER MELANIN HYPERPIGMENTATION: ICD-10-CM

## 2023-11-30 PROBLEM — D48.5 NEOPLASM OF UNCERTAIN BEHAVIOR OF SKIN: Status: ACTIVE | Noted: 2023-11-30

## 2023-11-30 PROCEDURE — 17003 DESTRUCT PREMALG LES 2-14: CPT

## 2023-11-30 PROCEDURE — 17000 DESTRUCT PREMALG LESION: CPT | Mod: 59

## 2023-11-30 PROCEDURE — ? PREMALIGNANT DESTRUCTION

## 2023-11-30 PROCEDURE — ? COUNSELING

## 2023-11-30 PROCEDURE — 11102 TANGNTL BX SKIN SINGLE LES: CPT

## 2023-11-30 PROCEDURE — ? BIOPSY BY SHAVE METHOD

## 2023-11-30 PROCEDURE — ? PHOTO-DOCUMENTATION

## 2023-11-30 ASSESSMENT — LOCATION DETAILED DESCRIPTION DERM
LOCATION DETAILED: LEFT MEDIAL MALAR CHEEK
LOCATION DETAILED: RIGHT NASAL DORSUM
LOCATION DETAILED: RIGHT LATERAL TEMPLE
LOCATION DETAILED: LEFT CENTRAL MALAR CHEEK
LOCATION DETAILED: LEFT MID TEMPLE
LOCATION DETAILED: LEFT LATERAL EYEBROW
LOCATION DETAILED: RIGHT MEDIAL FOREHEAD
LOCATION DETAILED: RIGHT INFERIOR LATERAL FOREHEAD
LOCATION DETAILED: RIGHT FOREHEAD
LOCATION DETAILED: LEFT INFERIOR TEMPLE

## 2023-11-30 ASSESSMENT — LOCATION ZONE DERM
LOCATION ZONE: NOSE
LOCATION ZONE: FACE

## 2023-11-30 ASSESSMENT — LOCATION SIMPLE DESCRIPTION DERM
LOCATION SIMPLE: RIGHT FOREHEAD
LOCATION SIMPLE: RIGHT TEMPLE
LOCATION SIMPLE: NOSE
LOCATION SIMPLE: LEFT TEMPLE
LOCATION SIMPLE: LEFT EYEBROW
LOCATION SIMPLE: LEFT CHEEK

## 2023-11-30 NOTE — PROCEDURE: BIOPSY BY SHAVE METHOD

## 2023-11-30 NOTE — PROCEDURE: PREMALIGNANT DESTRUCTION
Post-Care Instructions: I reviewed with the patient in detail post-care instructions. Patient is to wear sunprotection, and avoid picking at any of the treated lesions. Pt may apply Vaseline to crusted or scabbing areas.
Render Note In Bullet Format When Appropriate: No
Consent: The patient's consent was obtained including but not limited to risks of crusting, scabbing, blistering, scarring, darker or lighter pigmentary change, recurrence, incomplete removal and infection.
Detail Level: Zone
Anesthesia Volume In Cc: 0.5
Method: liquid nitrogen

## 2024-02-02 ENCOUNTER — TRANSCRIBE ORDERS (OUTPATIENT)
Dept: SCHEDULING | Age: 59
End: 2024-02-02

## 2024-02-02 DIAGNOSIS — R07.2 PRECORDIAL PAIN: Primary | ICD-10-CM

## 2024-05-16 ENCOUNTER — APPOINTMENT (RX ONLY)
Dept: URBAN - METROPOLITAN AREA CLINIC 374 | Facility: CLINIC | Age: 59
Setting detail: DERMATOLOGY
End: 2024-05-16

## 2024-05-16 DIAGNOSIS — L57.0 ACTINIC KERATOSIS: ICD-10-CM

## 2024-05-16 PROCEDURE — 17003 DESTRUCT PREMALG LES 2-14: CPT

## 2024-05-16 PROCEDURE — 17000 DESTRUCT PREMALG LESION: CPT

## 2024-05-16 PROCEDURE — ? PREMALIGNANT DESTRUCTION

## 2024-05-16 PROCEDURE — ? COUNSELING

## 2024-05-16 ASSESSMENT — LOCATION SIMPLE DESCRIPTION DERM
LOCATION SIMPLE: ANTERIOR SCALP
LOCATION SIMPLE: RIGHT FOREHEAD
LOCATION SIMPLE: LEFT UPPER ARM
LOCATION SIMPLE: RIGHT UPPER ARM

## 2024-05-16 ASSESSMENT — LOCATION DETAILED DESCRIPTION DERM
LOCATION DETAILED: LEFT ANTERIOR PROXIMAL UPPER ARM
LOCATION DETAILED: MID-FRONTAL SCALP
LOCATION DETAILED: RIGHT FOREHEAD
LOCATION DETAILED: RIGHT LATERAL FOREHEAD
LOCATION DETAILED: RIGHT ANTERIOR PROXIMAL UPPER ARM

## 2024-05-16 ASSESSMENT — LOCATION ZONE DERM
LOCATION ZONE: SCALP
LOCATION ZONE: ARM
LOCATION ZONE: FACE

## 2024-05-16 NOTE — PROCEDURE: PREMALIGNANT DESTRUCTION
Render Post-Care In The Note: No
Detail Level: Zone
Anesthesia Volume In Cc: 0
Consent: The patient's consent was obtained including but not limited to risks of crusting, scabbing, blistering, scarring, darker or lighter pigmentary change, recurrence, incomplete removal and infection.
Method: liquid nitrogen
Post-Care Instructions: I reviewed with the patient in detail post-care instructions. Patient is to wear sunprotection, and avoid picking at any of the treated lesions. Pt may apply Vaseline to crusted or scabbing areas.

## 2024-11-13 ENCOUNTER — APPOINTMENT (RX ONLY)
Dept: URBAN - METROPOLITAN AREA CLINIC 374 | Facility: CLINIC | Age: 59
Setting detail: DERMATOLOGY
End: 2024-11-13

## 2024-11-13 DIAGNOSIS — L82.1 OTHER SEBORRHEIC KERATOSIS: ICD-10-CM

## 2024-11-13 DIAGNOSIS — L81.4 OTHER MELANIN HYPERPIGMENTATION: ICD-10-CM

## 2024-11-13 DIAGNOSIS — D22 MELANOCYTIC NEVI: ICD-10-CM

## 2024-11-13 DIAGNOSIS — Z85.828 PERSONAL HISTORY OF OTHER MALIGNANT NEOPLASM OF SKIN: ICD-10-CM

## 2024-11-13 DIAGNOSIS — D18.0 HEMANGIOMA: ICD-10-CM

## 2024-11-13 DIAGNOSIS — L738 OTHER SPECIFIED DISEASES OF HAIR AND HAIR FOLLICLES: ICD-10-CM | Status: INADEQUATELY CONTROLLED

## 2024-11-13 DIAGNOSIS — T07XXXA ABRASION OR FRICTION BURN OF OTHER, MULTIPLE, AND UNSPECIFIED SITES, WITHOUT MENTION OF INFECTION: ICD-10-CM | Status: INADEQUATELY CONTROLLED

## 2024-11-13 DIAGNOSIS — L57.0 ACTINIC KERATOSIS: ICD-10-CM | Status: INADEQUATELY CONTROLLED

## 2024-11-13 DIAGNOSIS — L663 OTHER SPECIFIED DISEASES OF HAIR AND HAIR FOLLICLES: ICD-10-CM | Status: INADEQUATELY CONTROLLED

## 2024-11-13 DIAGNOSIS — L73.9 FOLLICULAR DISORDER, UNSPECIFIED: ICD-10-CM | Status: INADEQUATELY CONTROLLED

## 2024-11-13 PROBLEM — L02.821 FURUNCLE OF HEAD [ANY PART, EXCEPT FACE]: Status: ACTIVE | Noted: 2024-11-13

## 2024-11-13 PROBLEM — D18.01 HEMANGIOMA OF SKIN AND SUBCUTANEOUS TISSUE: Status: ACTIVE | Noted: 2024-11-13

## 2024-11-13 PROBLEM — S40.212A ABRASION OF LEFT SHOULDER, INITIAL ENCOUNTER: Status: ACTIVE | Noted: 2024-11-13

## 2024-11-13 PROBLEM — D22.5 MELANOCYTIC NEVI OF TRUNK: Status: ACTIVE | Noted: 2024-11-13

## 2024-11-13 PROBLEM — S00.81XA ABRASION OF OTHER PART OF HEAD, INITIAL ENCOUNTER: Status: ACTIVE | Noted: 2024-11-13

## 2024-11-13 PROCEDURE — ? MEDICATION COUNSELING

## 2024-11-13 PROCEDURE — ? PRESCRIPTION MEDICATION MANAGEMENT

## 2024-11-13 PROCEDURE — 17003 DESTRUCT PREMALG LES 2-14: CPT

## 2024-11-13 PROCEDURE — ? LIQUID NITROGEN

## 2024-11-13 PROCEDURE — ? COUNSELING

## 2024-11-13 PROCEDURE — ? ADDITIONAL NOTES

## 2024-11-13 PROCEDURE — ? FULL BODY SKIN EXAM

## 2024-11-13 PROCEDURE — 17000 DESTRUCT PREMALG LESION: CPT

## 2024-11-13 PROCEDURE — 99213 OFFICE O/P EST LOW 20 MIN: CPT | Mod: 25

## 2024-11-13 PROCEDURE — ? PRESCRIPTION

## 2024-11-13 RX ORDER — TRIAMCINOLONE ACETONIDE 1 MG/ML
LOTION TOPICAL
Qty: 60 | Refills: 1 | Status: ERX | COMMUNITY
Start: 2024-11-13

## 2024-11-13 RX ADMIN — TRIAMCINOLONE ACETONIDE: 1 LOTION TOPICAL at 00:00

## 2024-11-13 ASSESSMENT — LOCATION SIMPLE DESCRIPTION DERM
LOCATION SIMPLE: CHEST
LOCATION SIMPLE: NOSE
LOCATION SIMPLE: RIGHT SHOULDER
LOCATION SIMPLE: RIGHT CHEEK
LOCATION SIMPLE: LEFT SCALP
LOCATION SIMPLE: UPPER BACK
LOCATION SIMPLE: ABDOMEN
LOCATION SIMPLE: LEFT CHEEK
LOCATION SIMPLE: LEFT SHOULDER
LOCATION SIMPLE: RIGHT UPPER BACK
LOCATION SIMPLE: RIGHT FOREHEAD

## 2024-11-13 ASSESSMENT — LOCATION DETAILED DESCRIPTION DERM
LOCATION DETAILED: UPPER STERNUM
LOCATION DETAILED: INFERIOR THORACIC SPINE
LOCATION DETAILED: LEFT LATERAL SHOULDER
LOCATION DETAILED: EPIGASTRIC SKIN
LOCATION DETAILED: RIGHT SUPERIOR LATERAL MALAR CHEEK
LOCATION DETAILED: RIGHT LATERAL MALAR CHEEK
LOCATION DETAILED: RIGHT ANTERIOR SHOULDER
LOCATION DETAILED: LEFT CENTRAL MALAR CHEEK
LOCATION DETAILED: LEFT ANTERIOR SHOULDER
LOCATION DETAILED: RIGHT INFERIOR MEDIAL UPPER BACK
LOCATION DETAILED: LEFT INFERIOR CENTRAL MALAR CHEEK
LOCATION DETAILED: NASAL DORSUM
LOCATION DETAILED: LEFT LATERAL MALAR CHEEK
LOCATION DETAILED: LEFT MEDIAL FRONTAL SCALP
LOCATION DETAILED: RIGHT LATERAL FOREHEAD

## 2024-11-13 ASSESSMENT — LOCATION ZONE DERM
LOCATION ZONE: FACE
LOCATION ZONE: NOSE
LOCATION ZONE: TRUNK
LOCATION ZONE: ARM
LOCATION ZONE: SCALP

## 2024-11-13 NOTE — PROCEDURE: PRESCRIPTION MEDICATION MANAGEMENT
Initiate Treatment: triamcinolone acetonide 0.1 % lotion : Apply to AA of skin BID for no more than 2-3 weeks at a time
Detail Level: Zone
Render In Strict Bullet Format?: No

## 2024-11-13 NOTE — PROCEDURE: ADDITIONAL NOTES
Additional Notes: Advised patient to return to office if not resolved in 1 month for re-evaluation.
Detail Level: Simple
Render Risk Assessment In Note?: no

## 2024-11-13 NOTE — PROCEDURE: LIQUID NITROGEN
Show Aperture Variable?: Yes
Duration Of Freeze Thaw-Cycle (Seconds): 3
Application Tool (Optional): Liquid Nitrogen Sprayer
Number Of Freeze-Thaw Cycles: 3 freeze-thaw cycles
Render Post-Care Instructions In Note?: no
Post-Care Instructions: I reviewed with the patient in detail post-care instructions. Patient is to wear sunprotection, and avoid picking at any of the treated lesions. Pt may apply Vaseline to crusted or scabbing areas.
Consent: The patient's consent was obtained including but not limited to risks of crusting, scabbing, blistering, scarring, darker or lighter pigmentary change, recurrence, incomplete removal and infection.
Detail Level: Detailed

## 2024-11-13 NOTE — PROCEDURE: MEDICATION COUNSELING
Azathioprine Counseling:  I discussed with the patient the risks of azathioprine including but not limited to myelosuppression, immunosuppression, hepatotoxicity, lymphoma, and infections.  The patient understands that monitoring is required including baseline LFTs, Creatinine, possible TPMP genotyping and weekly CBCs for the first month and then every 2 weeks thereafter.  The patient verbalized understanding of the proper use and possible adverse effects of azathioprine.  All of the patient's questions and concerns were addressed.
Imiquimod Counseling:  I discussed with the patient the risks of imiquimod including but not limited to erythema, scaling, itching, weeping, crusting, and pain.  Patient understands that the inflammatory response to imiquimod is variable from person to person and was educated regarded proper titration schedule.  If flu-like symptoms develop, patient knows to discontinue the medication and contact us.
Taltz Counseling: I discussed with the patient the risks of ixekizumab including but not limited to immunosuppression, serious infections, worsening of inflammatory bowel disease and drug reactions.  The patient understands that monitoring is required including a PPD at baseline and must alert us or the primary physician if symptoms of infection or other concerning signs are noted.
Topical Sulfur Applications Pregnancy And Lactation Text: This medication is considered safe during pregnancy and breast feeding secondary to limited systemic absorption.
Propranolol Pregnancy And Lactation Text: This medication is Pregnancy Category C and it isn't known if it is safe during pregnancy. It is excreted in breast milk.
Bactrim Pregnancy And Lactation Text: This medication is Pregnancy Category D and is known to cause fetal risk.  It is also excreted in breast milk.
Sarecycline Counseling: Patient advised regarding possible photosensitivity and discoloration of the teeth, skin, lips, tongue and gums.  Patient instructed to avoid sunlight, if possible.  When exposed to sunlight, patients should wear protective clothing, sunglasses, and sunscreen.  The patient was instructed to call the office immediately if the following severe adverse effects occur:  hearing changes, easy bruising/bleeding, severe headache, or vision changes.  The patient verbalized understanding of the proper use and possible adverse effects of sarecycline.  All of the patient's questions and concerns were addressed.
Methotrexate Counseling:  Patient counseled regarding adverse effects of methotrexate including but not limited to nausea, vomiting, abnormalities in liver function tests. Patients may develop mouth sores, rash, diarrhea, and abnormalities in blood counts. The patient understands that monitoring is required including LFT's and blood counts.  There is a rare possibility of scarring of the liver and lung problems that can occur when taking methotrexate. Persistent nausea, loss of appetite, pale stools, dark urine, cough, and shortness of breath should be reported immediately. Patient advised to discontinue methotrexate treatment at least three months before attempting to become pregnant.  I discussed the need for folate supplements while taking methotrexate.  These supplements can decrease side effects during methotrexate treatment. The patient verbalized understanding of the proper use and possible adverse effects of methotrexate.  All of the patient's questions and concerns were addressed.
Erivedge Counseling- I discussed with the patient the risks of Erivedge including but not limited to nausea, vomiting, diarrhea, constipation, weight loss, changes in the sense of taste, decreased appetite, muscle spasms, and hair loss.  The patient verbalized understanding of the proper use and possible adverse effects of Erivedge.  All of the patient's questions and concerns were addressed.
Solaraze Counseling:  I discussed with the patient the risks of Solaraze including but not limited to erythema, scaling, itching, weeping, crusting, and pain.
Glycopyrrolate Pregnancy And Lactation Text: This medication is Pregnancy Category B and is considered safe during pregnancy. It is unknown if it is excreted breast milk.
Olumiant Pregnancy And Lactation Text: Based on animal studies, Olumiant may cause embryo-fetal harm when administered to pregnant women.  The medication should not be used in pregnancy.  Breastfeeding is not recommended during treatment.
Imiquimod Pregnancy And Lactation Text: This medication is Pregnancy Category C. It is unknown if this medication is excreted in breast milk.
Azathioprine Pregnancy And Lactation Text: This medication is Pregnancy Category D and isn't considered safe during pregnancy. It is unknown if this medication is excreted in breast milk.
Albendazole Counseling:  I discussed with the patient the risks of albendazole including but not limited to cytopenia, kidney damage, nausea/vomiting and severe allergy.  The patient understands that this medication is being used in an off-label manner.
Bexarotene Counseling:  I discussed with the patient the risks of bexarotene including but not limited to hair loss, dry lips/skin/eyes, liver abnormalities, hyperlipidemia, pancreatitis, depression/suicidal ideation, photosensitivity, drug rash/allergic reactions, hypothyroidism, anemia, leukopenia, infection, cataracts, and teratogenicity.  Patient understands that they will need regular blood tests to check lipid profile, liver function tests, white blood cell count, thyroid function tests and pregnancy test if applicable.
Sarecycline Pregnancy And Lactation Text: This medication is Pregnancy Category D and not consider safe during pregnancy. It is also excreted in breast milk.
Methotrexate Pregnancy And Lactation Text: This medication is Pregnancy Category X and is known to cause fetal harm. This medication is excreted in breast milk.
Wartpeel Counseling:  I discussed with the patient the risks of Wartpeel including but not limited to erythema, scaling, itching, weeping, crusting, and pain.
Taltz Pregnancy And Lactation Text: The risk during pregnancy and breastfeeding is uncertain with this medication.
Rinvoq Counseling: I discussed with the patient the risks of Rinvoq therapy including but not limited to upper respiratory tract infections, shingles, cold sores, bronchitis, nausea, cough, fever, acne, and headache. Live vaccines should be avoided.  This medication has been linked to serious infections; higher rate of mortality; malignancy and lymphoproliferative disorders; major adverse cardiovascular events; thrombosis; thrombocytopenia, anemia, and neutropenia; lipid elevations; liver enzyme elevations; and gastrointestinal perforations.
SSKI Counseling:  I discussed with the patient the risks of SSKI including but not limited to thyroid abnormalities, metallic taste, GI upset, fever, headache, acne, arthralgias, paraesthesias, lymphadenopathy, easy bleeding, arrhythmias, and allergic reaction.
Cephalexin Counseling: I counseled the patient regarding use of cephalexin as an antibiotic for prophylactic and/or therapeutic purposes. Cephalexin (commonly prescribed under brand name Keflex) is a cephalosporin antibiotic which is active against numerous classes of bacteria, including most skin bacteria. Side effects may include nausea, diarrhea, gastrointestinal upset, rash, hives, yeast infections, and in rare cases, hepatitis, kidney disease, seizures, fever, confusion, neurologic symptoms, and others. Patients with severe allergies to penicillin medications are cautioned that there is about a 10% incidence of cross-reactivity with cephalosporins. When possible, patients with penicillin allergies should use alternatives to cephalosporins for antibiotic therapy.
Solaraze Pregnancy And Lactation Text: This medication is Pregnancy Category B and is considered safe. There is some data to suggest avoiding during the third trimester. It is unknown if this medication is excreted in breast milk.
Erivedge Pregnancy And Lactation Text: This medication is Pregnancy Category X and is absolutely contraindicated during pregnancy. It is unknown if it is excreted in breast milk.
Hydroxychloroquine Counseling:  I discussed with the patient that a baseline ophthalmologic exam is needed at the start of therapy and every year thereafter while on therapy. A CBC may also be warranted for monitoring.  The side effects of this medication were discussed with the patient, including but not limited to agranulocytosis, aplastic anemia, seizures, rashes, retinopathy, and liver toxicity. Patient instructed to call the office should any adverse effect occur.  The patient verbalized understanding of the proper use and possible adverse effects of Plaquenil.  All the patient's questions and concerns were addressed.
Bexarotene Pregnancy And Lactation Text: This medication is Pregnancy Category X and should not be given to women who are pregnant or may become pregnant. This medication should not be used if you are breast feeding.
Klisyri Counseling:  I discussed with the patient the risks of Klisyri including but not limited to erythema, scaling, itching, weeping, crusting, and pain.
Tetracycline Counseling: Patient counseled regarding possible photosensitivity and increased risk for sunburn.  Patient instructed to avoid sunlight, if possible.  When exposed to sunlight, patients should wear protective clothing, sunglasses, and sunscreen.  The patient was instructed to call the office immediately if the following severe adverse effects occur:  hearing changes, easy bruising/bleeding, severe headache, or vision changes.  The patient verbalized understanding of the proper use and possible adverse effects of tetracycline.  All of the patient's questions and concerns were addressed. Patient understands to avoid pregnancy while on therapy due to potential birth defects.
Tremfya Counseling: I discussed with the patient the risks of guselkumab including but not limited to immunosuppression, serious infections, and drug reactions.  The patient understands that monitoring is required including a PPD at baseline and must alert us or the primary physician if symptoms of infection or other concerning signs are noted.
Cellcept Counseling:  I discussed with the patient the risks of mycophenolate mofetil including but not limited to infection/immunosuppression, GI upset, hypokalemia, hypercholesterolemia, bone marrow suppression, lymphoproliferative disorders, malignancy, GI ulceration/bleed/perforation, colitis, interstitial lung disease, kidney failure, progressive multifocal leukoencephalopathy, and birth defects.  The patient understands that monitoring is required including a baseline creatinine and regular CBC testing. In addition, patient must alert us immediately if symptoms of infection or other concerning signs are noted.
Albendazole Pregnancy And Lactation Text: This medication is Pregnancy Category C and it isn't known if it is safe during pregnancy. It is also excreted in breast milk.
Sski Pregnancy And Lactation Text: This medication is Pregnancy Category D and isn't considered safe during pregnancy. It is excreted in breast milk.
Wartpeel Pregnancy And Lactation Text: This medication is Pregnancy Category X and contraindicated in pregnancy and in women who may become pregnant. It is unknown if this medication is excreted in breast milk.
Prednisone Counseling:  I discussed with the patient the risks of prolonged use of prednisone including but not limited to weight gain, insomnia, osteoporosis, mood changes, diabetes, susceptibility to infection, glaucoma and high blood pressure.  In cases where prednisone use is prolonged, patients should be monitored with blood pressure checks, serum glucose levels and an eye exam.  Additionally, the patient may need to be placed on GI prophylaxis, PCP prophylaxis, and calcium and vitamin D supplementation and/or a bisphosphonate.  The patient verbalized understanding of the proper use and the possible adverse effects of prednisone.  All of the patient's questions and concerns were addressed.
Soolantra Counseling: I discussed with the patients the risks of topial Soolantra. This is a medicine which decreases the number of mites and inflammation in the skin. You experience burning, stinging, eye irritation or allergic reactions.  Please call our office if you develop any problems from using this medication.
Libtayo Counseling- I discussed with the patient the risks of Libtayo including but not limited to nausea, vomiting, diarrhea, and bone or muscle pain.  The patient verbalized understanding of the proper use and possible adverse effects of Libtayo.  All of the patient's questions and concerns were addressed.
Rinvoq Pregnancy And Lactation Text: Based on animal studies, Rinvoq may cause embryo-fetal harm when administered to pregnant women.  The medication should not be used in pregnancy.  Breastfeeding is not recommended during treatment and for 6 days after the last dose.
Hydroxychloroquine Pregnancy And Lactation Text: This medication has been shown to cause fetal harm but it isn't assigned a Pregnancy Risk Category. There are small amounts excreted in breast milk.
Opioid Counseling: I discussed with the patient the potential side effects of opioids including but not limited to addiction, altered mental status, and depression. I stressed avoiding alcohol, benzodiazepines, muscle relaxants and sleep aids unless specifically okayed by a physician. The patient verbalized understanding of the proper use and possible adverse effects of opioids. All of the patient's questions and concerns were addressed. They were instructed to flush the remaining pills down the toilet if they did not need them for pain.
Klisyri Pregnancy And Lactation Text: It is unknown if this medication can harm a developing fetus or if it is excreted in breast milk.
Isotretinoin Counseling: Patient should get monthly blood tests, not donate blood, not drive at night if vision affected, not share medication, and not undergo elective surgery for 6 months after tx completed. Side effects reviewed, pt to contact office should one occur.
Ivermectin Counseling:  Patient instructed to take medication on an empty stomach with a full glass of water.  Patient informed of potential adverse effects including but not limited to nausea, diarrhea, dizziness, itching, and swelling of the extremities or lymph nodes.  The patient verbalized understanding of the proper use and possible adverse effects of ivermectin.  All of the patient's questions and concerns were addressed.
Skyrizi Counseling: I discussed with the patient the risks of risankizumab-rzaa including but not limited to immunosuppression, and serious infections.  The patient understands that monitoring is required including a PPD at baseline and must alert us or the primary physician if symptoms of infection or other concerning signs are noted.
Thalidomide Counseling: I discussed with the patient the risks of thalidomide including but not limited to birth defects, anxiety, weakness, chest pain, dizziness, cough and severe allergy.
Prednisone Pregnancy And Lactation Text: This medication is Pregnancy Category C and it isn't know if it is safe during pregnancy. This medication is excreted in breast milk.
Winlevi Counseling:  I discussed with the patient the risks of topical clascoterone including but not limited to erythema, scaling, itching, and stinging. Patient voiced their understanding.
Libtayo Pregnancy And Lactation Text: This medication is contraindicated in pregnancy and when breast feeding.
Soolantra Pregnancy And Lactation Text: This medication is Pregnancy Category C. This medication is considered safe during breast feeding.
Sotyktu Counseling:  I discussed the most common side effects of Sotyktu including: common cold, sore throat, sinus infections, cold sores, canker sores, folliculitis, and acne.  I also discussed more serious side effects of Sotyktu including but not limited to: serious allergic reactions; increased risk for infections such as TB; cancers such as lymphomas; rhabdomyolysis and elevated CPK; and elevated triglycerides and liver enzymes. 
Rituxan Counseling:  I discussed with the patient the risks of Rituxan infusions. Side effects can include infusion reactions, severe drug rashes including mucocutaneous reactions, reactivation of latent hepatitis and other infections and rarely progressive multifocal leukoencephalopathy.  All of the patient's questions and concerns were addressed.
Low Dose Naltrexone Counseling- I discussed with the patient the potential risks and side effects of low dose naltrexone including but not limited to: more vivid dreams, headaches, nausea, vomiting, abdominal pain, fatigue, dizziness, and anxiety.
Cimetidine Counseling:  I discussed with the patient the risks of Cimetidine including but not limited to gynecomastia, headache, diarrhea, nausea, drowsiness, arrhythmias, pancreatitis, skin rashes, psychosis, bone marrow suppression and kidney toxicity.
Cephalexin Pregnancy And Lactation Text: This medication is Pregnancy Category B and considered safe during pregnancy.  It is also excreted in breast milk but can be used safely for shorter doses.
Opioid Pregnancy And Lactation Text: These medications can lead to premature delivery and should be avoided during pregnancy. These medications are also present in breast milk in small amounts.
Isotretinoin Pregnancy And Lactation Text: This medication is Pregnancy Category X and is considered extremely dangerous during pregnancy. It is unknown if it is excreted in breast milk.
Minoxidil Counseling: Minoxidil is a topical medication which can increase blood flow where it is applied. It is uncertain how this medication increases hair growth. Side effects are uncommon and include stinging and allergic reactions.
Xolair Counseling:  Patient informed of potential adverse effects including but not limited to fever, muscle aches, rash and allergic reactions.  The patient verbalized understanding of the proper use and possible adverse effects of Xolair.  All of the patient's questions and concerns were addressed.
Winlevi Pregnancy And Lactation Text: This medication is considered safe during pregnancy and breastfeeding.
Hyrimoz Counseling:  I discussed with the patient the risks of adalimumab including but not limited to myelosuppression, immunosuppression, autoimmune hepatitis, demyelinating diseases, lymphoma, and serious infections.  The patient understands that monitoring is required including a PPD at baseline and must alert us or the primary physician if symptoms of infection or other concerning signs are noted.
Odomzo Counseling- I discussed with the patient the risks of Odomzo including but not limited to nausea, vomiting, diarrhea, constipation, weight loss, changes in the sense of taste, decreased appetite, muscle spasms, and hair loss.  The patient verbalized understanding of the proper use and possible adverse effects of Odomzo.  All of the patient's questions and concerns were addressed.
Dapsone Counseling: I discussed with the patient the risks of dapsone including but not limited to hemolytic anemia, agranulocytosis, rashes, methemoglobinemia, kidney failure, peripheral neuropathy, headaches, GI upset, and liver toxicity.  Patients who start dapsone require monitoring including baseline LFTs and weekly CBCs for the first month, then every month thereafter.  The patient verbalized understanding of the proper use and possible adverse effects of dapsone.  All of the patient's questions and concerns were addressed.
Fluconazole Counseling:  Patient counseled regarding adverse effects of fluconazole including but not limited to headache, diarrhea, nausea, upset stomach, liver function test abnormalities, taste disturbance, and stomach pain.  There is a rare possibility of liver failure that can occur when taking fluconazole.  The patient understands that monitoring of LFTs and kidney function test may be required, especially at baseline. The patient verbalized understanding of the proper use and possible adverse effects of fluconazole.  All of the patient's questions and concerns were addressed.
Cyclophosphamide Counseling:  I discussed with the patient the risks of cyclophosphamide including but not limited to hair loss, hormonal abnormalities, decreased fertility, abdominal pain, diarrhea, nausea and vomiting, bone marrow suppression and infection. The patient understands that monitoring is required while taking this medication.
Benzoyl Peroxide Pregnancy And Lactation Text: This medication is Pregnancy Category C. It is unknown if benzoyl peroxide is excreted in breast milk.
Protopic Pregnancy And Lactation Text: This medication is Pregnancy Category C. It is unknown if this medication is excreted in breast milk when applied topically.
Otezla Counseling: The side effects of Otezla were discussed with the patient, including but not limited to worsening or new depression, weight loss, diarrhea, nausea, upper respiratory tract infection, and headache. Patient instructed to call the office should any adverse effect occur.  The patient verbalized understanding of the proper use and possible adverse effects of Otezla.  All the patient's questions and concerns were addressed.
Cibinqo Counseling: I discussed with the patient the risks of Cibinqo therapy including but not limited to common cold, nausea, headache, cold sores, increased blood CPK levels, dizziness, UTIs, fatigue, acne, and vomitting. Live vaccines should be avoided.  This medication has been linked to serious infections; higher rate of mortality; malignancy and lymphoproliferative disorders; major adverse cardiovascular events; thrombosis; thrombocytopenia and lymphopenia; lipid elevations; and retinal detachment.
Eucrisa Counseling: Patient may experience a mild burning sensation during topical application. Eucrisa is not approved in children less than 3 months of age.
Topical Metronidazole Pregnancy And Lactation Text: This medication is Pregnancy Category B and considered safe during pregnancy.  It is also considered safe to use while breastfeeding.
Arava Counseling:  Patient counseled regarding adverse effects of Arava including but not limited to nausea, vomiting, abnormalities in liver function tests. Patients may develop mouth sores, rash, diarrhea, and abnormalities in blood counts. The patient understands that monitoring is required including LFTs and blood counts.  There is a rare possibility of scarring of the liver and lung problems that can occur when taking methotrexate. Persistent nausea, loss of appetite, pale stools, dark urine, cough, and shortness of breath should be reported immediately. Patient advised to discontinue Arava treatment and consult with a physician prior to attempting conception. The patient will have to undergo a treatment to eliminate Arava from the body prior to conception.
Otezla Pregnancy And Lactation Text: This medication is Pregnancy Category C and it isn't known if it is safe during pregnancy. It is unknown if it is excreted in breast milk.
Quinolones Counseling:  I discussed with the patient the risks of fluoroquinolones including but not limited to GI upset, allergic reaction, drug rash, diarrhea, dizziness, photosensitivity, yeast infections, liver function test abnormalities, tendonitis/tendon rupture.
Dapsone Pregnancy And Lactation Text: This medication is Pregnancy Category C and is not considered safe during pregnancy or breast feeding.
Qbrexza Counseling:  I discussed with the patient the risks of Qbrexza including but not limited to headache, mydriasis, blurred vision, dry eyes, nasal dryness, dry mouth, dry throat, dry skin, urinary hesitation, and constipation.  Local skin reactions including erythema, burning, stinging, and itching can also occur.
Carac Counseling:  I discussed with the patient the risks of Carac including but not limited to erythema, scaling, itching, weeping, crusting, and pain.
Fluconazole Pregnancy And Lactation Text: This medication is Pregnancy Category C and it isn't know if it is safe during pregnancy. It is also excreted in breast milk.
Cibinqo Pregnancy And Lactation Text: It is unknown if this medication will adversely affect pregnancy or breast feeding.  You should not take this medication if you are currently pregnant or planning a pregnancy or while breastfeeding.
Eucrisa Pregnancy And Lactation Text: This medication has not been assigned a Pregnancy Risk Category but animal studies failed to show danger with the topical medication. It is unknown if the medication is excreted in breast milk.
Topical Steroids Counseling: I discussed with the patient that prolonged use of topical steroids can result in the increased appearance of superficial blood vessels (telangiectasias), lightening (hypopigmentation) and thinning of the skin (atrophy).  Patient understands to avoid using high potency steroids in skin folds, the groin or the face.  The patient verbalized understanding of the proper use and possible adverse effects of topical steroids.  All of the patient's questions and concerns were addressed.
Cyclophosphamide Pregnancy And Lactation Text: This medication is Pregnancy Category D and it isn't considered safe during pregnancy. This medication is excreted in breast milk.
Griseofulvin Counseling:  I discussed with the patient the risks of griseofulvin including but not limited to photosensitivity, cytopenia, liver damage, nausea/vomiting and severe allergy.  The patient understands that this medication is best absorbed when taken with a fatty meal (e.g., ice cream or french fries).
Litfulo Counseling: I discussed with the patient the risks of Litfulo therapy including but not limited to upper respiratory tract infections, shingles, cold sores, and nausea. Live vaccines should be avoided.  This medication has been linked to serious infections; higher rate of mortality; malignancy and lymphoproliferative disorders; major adverse cardiovascular events; thrombosis; gastrointestinal perforations; neutropenia; lymphopenia; anemia; liver enzyme elevations; and lipid elevations.
Azithromycin Counseling:  I discussed with the patient the risks of azithromycin including but not limited to GI upset, allergic reaction, drug rash, diarrhea, and yeast infections.
Oxybutynin Counseling:  I discussed with the patient the risks of oxybutynin including but not limited to skin rash, drowsiness, dry mouth, difficulty urinating, and blurred vision.
Qbrexza Pregnancy And Lactation Text: There is no available data on Qbrexza use in pregnant women.  There is no available data on Qbrexza use in lactation.
Gabapentin Counseling: I discussed with the patient the risks of gabapentin including but not limited to dizziness, somnolence, fatigue and ataxia.
Hydroquinone Counseling:  Patient advised that medication may result in skin irritation, lightening (hypopigmentation), dryness, and burning.  In the event of skin irritation, the patient was advised to reduce the amount of the drug applied or use it less frequently.  Rarely, spots that are treated with hydroquinone can become darker (pseudoochronosis).  Should this occur, patient instructed to stop medication and call the office. The patient verbalized understanding of the proper use and possible adverse effects of hydroquinone.  All of the patient's questions and concerns were addressed.
Rifampin Counseling: I discussed with the patient the risks of rifampin including but not limited to liver damage, kidney damage, red-orange body fluids, nausea/vomiting and severe allergy.
Cyclosporine Counseling:  I discussed with the patient the risks of cyclosporine including but not limited to hypertension, gingival hyperplasia,myelosuppression, immunosuppression, liver damage, kidney damage, neurotoxicity, lymphoma, and serious infections. The patient understands that monitoring is required including baseline blood pressure, CBC, CMP, lipid panel and uric acid, and then 1-2 times monthly CMP and blood pressure.
Topical Steroids Applications Pregnancy And Lactation Text: Most topical steroids are considered safe to use during pregnancy and lactation.  Any topical steroid applied to the breast or nipple should be washed off before breastfeeding.
Calcipotriene Counseling:  I discussed with the patient the risks of calcipotriene including but not limited to erythema, scaling, itching, and irritation.
Griseofulvin Pregnancy And Lactation Text: This medication is Pregnancy Category X and is known to cause serious birth defects. It is unknown if this medication is excreted in breast milk but breast feeding should be avoided.
Gabapentin Pregnancy And Lactation Text: This medication is Pregnancy Category C and isn't considered safe during pregnancy. It is excreted in breast milk.
Rhofade Counseling: Rhofade is a topical medication which can decrease superficial blood flow where applied. Side effects are uncommon and include stinging, redness and allergic reactions.
Oxybutynin Pregnancy And Lactation Text: This medication is Pregnancy Category B and is considered safe during pregnancy. It is unknown if it is excreted in breast milk.
Litfulo Pregnancy And Lactation Text: Based on animal studies, Lifulo may cause embryo-fetal harm when administered to pregnant women.  The medication should not be used in pregnancy.  Breastfeeding is not recommended during treatment.
Azithromycin Pregnancy And Lactation Text: This medication is considered safe during pregnancy and is also secreted in breast milk.
Cantharidin Pregnancy And Lactation Text: This medication has not been proven safe during pregnancy. It is unknown if this medication is excreted in breast milk.
Acitretin Counseling:  I discussed with the patient the risks of acitretin including but not limited to hair loss, dry lips/skin/eyes, liver damage, hyperlipidemia, depression/suicidal ideation, photosensitivity.  Serious rare side effects can include but are not limited to pancreatitis, pseudotumor cerebri, bony changes, clot formation/stroke/heart attack.  Patient understands that alcohol is contraindicated since it can result in liver toxicity and significantly prolong the elimination of the drug by many years.
Topical Sulfur Applications Counseling: Topical Sulfur Counseling: Patient counseled that this medication may cause skin irritation or allergic reactions.  In the event of skin irritation, the patient was advised to reduce the amount of the drug applied or use it less frequently.   The patient verbalized understanding of the proper use and possible adverse effects of topical sulfur application.  All of the patient's questions and concerns were addressed.
Rifampin Pregnancy And Lactation Text: This medication is Pregnancy Category C and it isn't know if it is safe during pregnancy. It is also excreted in breast milk and should not be used if you are breast feeding.
Propranolol Counseling:  I discussed with the patient the risks of propranolol including but not limited to low heart rate, low blood pressure, low blood sugar, restlessness and increased cold sensitivity. They should call the office if they experience any of these side effects.
Bactrim Counseling:  I discussed with the patient the risks of sulfa antibiotics including but not limited to GI upset, allergic reaction, drug rash, diarrhea, dizziness, photosensitivity, and yeast infections.  Rarely, more serious reactions can occur including but not limited to aplastic anemia, agranulocytosis, methemoglobinemia, blood dyscrasias, liver or kidney failure, lung infiltrates or desquamative/blistering drug rashes.
Itraconazole Counseling:  I discussed with the patient the risks of itraconazole including but not limited to liver damage, nausea/vomiting, neuropathy, and severe allergy.  The patient understands that this medication is best absorbed when taken with acidic beverages such as non-diet cola or ginger ale.  The patient understands that monitoring is required including baseline LFTs and repeat LFTs at intervals.  The patient understands that they are to contact us or the primary physician if concerning signs are noted.
Stelara Pregnancy And Lactation Text: This medication is Pregnancy Category B and is considered safe during pregnancy. It is unknown if this medication is excreted in breast milk.
Rhofade Pregnancy And Lactation Text: This medication has not been assigned a Pregnancy Risk Category. It is unknown if the medication is excreted in breast milk.
Glycopyrrolate Counseling:  I discussed with the patient the risks of glycopyrrolate including but not limited to skin rash, drowsiness, dry mouth, difficulty urinating, and blurred vision.
Olumiant Counseling: I discussed with the patient the risks of Olumiant therapy including but not limited to upper respiratory tract infections, shingles, cold sores, and nausea. Live vaccines should be avoided.  This medication has been linked to serious infections; higher rate of mortality; malignancy and lymphoproliferative disorders; major adverse cardiovascular events; thrombosis; gastrointestinal perforations; neutropenia; lymphopenia; anemia; liver enzyme elevations; and lipid elevations.
Acitretin Pregnancy And Lactation Text: This medication is Pregnancy Category X and should not be given to women who are pregnant or may become pregnant in the future. This medication is excreted in breast milk.
Dutasteride Pregnancy And Lactation Text: This medication is absolutely contraindicated in women, especially during pregnancy and breast feeding. Feminization of male fetuses is possible if taking while pregnant.
Ketoconazole Pregnancy And Lactation Text: This medication is Pregnancy Category C and it isn't know if it is safe during pregnancy. It is also excreted in breast milk and breast feeding isn't recommended.
Enbrel Counseling:  I discussed with the patient the risks of etanercept including but not limited to myelosuppression, immunosuppression, autoimmune hepatitis, demyelinating diseases, lymphoma, and infections.  The patient understands that monitoring is required including a PPD at baseline and must alert us or the primary physician if symptoms of infection or other concerning signs are noted.
Zyclara Counseling:  I discussed with the patient the risks of imiquimod including but not limited to erythema, scaling, itching, weeping, crusting, and pain.  Patient understands that the inflammatory response to imiquimod is variable from person to person and was educated regarded proper titration schedule.  If flu-like symptoms develop, patient knows to discontinue the medication and contact us.
Erythromycin Pregnancy And Lactation Text: This medication is Pregnancy Category B and is considered safe during pregnancy. It is also excreted in breast milk.
Simponi Counseling:  I discussed with the patient the risks of golimumab including but not limited to myelosuppression, immunosuppression, autoimmune hepatitis, demyelinating diseases, lymphoma, and serious infections.  The patient understands that monitoring is required including a PPD at baseline and must alert us or the primary physician if symptoms of infection or other concerning signs are noted.
Use Enhanced Medication Counseling?: No
Topical Clindamycin Counseling: Patient counseled that this medication may cause skin irritation or allergic reactions.  In the event of skin irritation, the patient was advised to reduce the amount of the drug applied or use it less frequently.   The patient verbalized understanding of the proper use and possible adverse effects of clindamycin.  All of the patient's questions and concerns were addressed.
Clofazimine Counseling:  I discussed with the patient the risks of clofazimine including but not limited to skin and eye pigmentation, liver damage, nausea/vomiting, gastrointestinal bleeding and allergy.
Aklief Pregnancy And Lactation Text: It is unknown if this medication is safe to use during pregnancy.  It is unknown if this medication is excreted in breast milk.  Breastfeeding women should use the topical cream on the smallest area of the skin for the shortest time needed while breastfeeding.  Do not apply to nipple and areola.
Opzelura Pregnancy And Lactation Text: There is insufficient data to evaluate drug-associated risk for major birth defects, miscarriage, or other adverse maternal or fetal outcomes.  There is a pregnancy registry that monitors pregnancy outcomes in pregnant persons exposed to the medication during pregnancy.  It is unknown if this medication is excreted in breast milk.  Do not breastfeed during treatment and for about 4 weeks after the last dose.
Cimzia Counseling:  I discussed with the patient the risks of Cimzia including but not limited to immunosuppression, allergic reactions and infections.  The patient understands that monitoring is required including a PPD at baseline and must alert us or the primary physician if symptoms of infection or other concerning signs are noted.
Olanzapine Counseling- I discussed with the patient the common side effects of olanzapine including but are not limited to: lack of energy, dry mouth, increased appetite, sleepiness, tremor, constipation, dizziness, changes in behavior, or restlessness.  Explained that teenagers are more likely to experience headaches, abdominal pain, pain in the arms or legs, tiredness, and sleepiness.  Serious side effects include but are not limited: increased risk of death in elderly patients who are confused, have memory loss, or dementia-related psychosis; hyperglycemia; increased cholesterol and triglycerides; and weight gain.
Drysol Counseling:  I discussed with the patient the risks of drysol/aluminum chloride including but not limited to skin rash, itching, irritation, burning.
Terbinafine Counseling: Patient counseling regarding adverse effects of terbinafine including but not limited to headache, diarrhea, rash, upset stomach, liver function test abnormalities, itching, taste/smell disturbance, nausea, abdominal pain, and flatulence.  There is a rare possibility of liver failure that can occur when taking terbinafine.  The patient understands that a baseline LFT and kidney function test may be required. The patient verbalized understanding of the proper use and possible adverse effects of terbinafine.  All of the patient's questions and concerns were addressed.
Nemluvio Counseling: I discussed with the patient the risks of nemolizumab including but not limited to headache, gastrointestinal complaints, nasopharyngitis, musculoskeletal complaints, injection site reactions, and allergic reactions. The patient understands that monitoring is required and they must alert us or the primary physician if any side effects are noted.
Finasteride Male Counseling: Finasteride Counseling:  I discussed with the patient the risks of use of finasteride including but not limited to decreased libido, decreased ejaculate volume, gynecomastia, and depression. Women should not handle medication.  All of the patient's questions and concerns were addressed.
Olanzapine Pregnancy And Lactation Text: This medication is pregnancy category C.   There are no adequate and well controlled trials with olanzapine in pregnant females.  Olanzapine should be used during pregnancy only if the potential benefit justifies the potential risk to the fetus.   In a study in lactating healthy women, olanzapine was excreted in breast milk.  It is recommended that women taking olanzapine should not breast feed.
Metronidazole Counseling:  I discussed with the patient the risks of metronidazole including but not limited to seizures, nausea/vomiting, a metallic taste in the mouth, nausea/vomiting and severe allergy.
Cimzia Pregnancy And Lactation Text: This medication crosses the placenta but can be considered safe in certain situations. Cimzia may be excreted in breast milk.
Picato Counseling:  I discussed with the patient the risks of Picato including but not limited to erythema, scaling, itching, weeping, crusting, and pain.
Azelaic Acid Counseling: Patient counseled that medicine may cause skin irritation and to avoid applying near the eyes.  In the event of skin irritation, the patient was advised to reduce the amount of the drug applied or use it less frequently.   The patient verbalized understanding of the proper use and possible adverse effects of azelaic acid.  All of the patient's questions and concerns were addressed.
Humira Counseling:  I discussed with the patient the risks of adalimumab including but not limited to myelosuppression, immunosuppression, autoimmune hepatitis, demyelinating diseases, lymphoma, and serious infections.  The patient understands that monitoring is required including a PPD at baseline and must alert us or the primary physician if symptoms of infection or other concerning signs are noted.
Drysol Pregnancy And Lactation Text: This medication is considered safe during pregnancy and breast feeding.
Metronidazole Pregnancy And Lactation Text: This medication is Pregnancy Category B and considered safe during pregnancy.  It is also excreted in breast milk.
Nemluvio Pregnancy And Lactation Text: It is not known if Nemluvio causes fetal harm or is present in breast milk. Please proceed with caution if patients who are pregnant or breastfeeding.
Finasteride Female Counseling: Finasteride Counseling:  I discussed with the patient the risks of use of finasteride including but not limited to decreased libido and sexual dysfunction. Explained the teratogenic nature of the medication and stressed the importance of not getting pregnant during treatment. All of the patient's questions and concerns were addressed.
Topical Ketoconazole Counseling: Patient counseled that this medication may cause skin irritation or allergic reactions.  In the event of skin irritation, the patient was advised to reduce the amount of the drug applied or use it less frequently.   The patient verbalized understanding of the proper use and possible adverse effects of ketoconazole.  All of the patient's questions and concerns were addressed.
Terbinafine Pregnancy And Lactation Text: This medication is Pregnancy Category B and is considered safe during pregnancy. It is also excreted in breast milk and breast feeding isn't recommended.
Oral Minoxidil Counseling- I discussed with the patient the risks of oral minoxidil including but not limited to shortness of breath, swelling of the feet or ankles, dizziness, lightheadedness, unwanted hair growth and allergic reaction.  The patient verbalized understanding of the proper use and possible adverse effects of oral minoxidil.  All of the patient's questions and concerns were addressed.
Detail Level: Simple
Colchicine Counseling:  Patient counseled regarding adverse effects including but not limited to stomach upset (nausea, vomiting, stomach pain, or diarrhea).  Patient instructed to limit alcohol consumption while taking this medication.  Colchicine may reduce blood counts especially with prolonged use.  The patient understands that monitoring of kidney function and blood counts may be required, especially at baseline. The patient verbalized understanding of the proper use and possible adverse effects of colchicine.  All of the patient's questions and concerns were addressed.
Cosentyx Counseling:  I discussed with the patient the risks of Cosentyx including but not limited to worsening of Crohn's disease, immunosuppression, allergic reactions and infections.  The patient understands that monitoring is required including a PPD at baseline and must alert us or the primary physician if symptoms of infection or other concerning signs are noted.
Elidel Counseling: Patient may experience a mild burning sensation during topical application. Elidel is not approved in children less than 2 years of age. There have been case reports of hematologic and skin malignancies in patients using topical calcineurin inhibitors although causality is questionable.
Finasteride Pregnancy And Lactation Text: This medication is absolutely contraindicated during pregnancy. It is unknown if it is excreted in breast milk.
Oral Minoxidil Pregnancy And Lactation Text: This medication should only be used when clearly needed if you are pregnant, attempting to become pregnant or breast feeding.
Minocycline Counseling: Patient advised regarding possible photosensitivity and discoloration of the teeth, skin, lips, tongue and gums.  Patient instructed to avoid sunlight, if possible.  When exposed to sunlight, patients should wear protective clothing, sunglasses, and sunscreen.  The patient was instructed to call the office immediately if the following severe adverse effects occur:  hearing changes, easy bruising/bleeding, severe headache, or vision changes.  The patient verbalized understanding of the proper use and possible adverse effects of minocycline.  All of the patient's questions and concerns were addressed.
Benzoyl Peroxide Counseling: Patient counseled that medicine may cause skin irritation and bleach clothing.  In the event of skin irritation, the patient was advised to reduce the amount of the drug applied or use it less frequently.   The patient verbalized understanding of the proper use and possible adverse effects of benzoyl peroxide.  All of the patient's questions and concerns were addressed.
Protopic Counseling: Patient may experience a mild burning sensation during topical application. Protopic is not approved in children less than 2 years of age. There have been case reports of hematologic and skin malignancies in patients using topical calcineurin inhibitors although causality is questionable.
Birth Control Pills Counseling: Birth Control Pill Counseling: I discussed with the patient the potential side effects of OCPs including but not limited to increased risk of stroke, heart attack, thrombophlebitis, deep venous thrombosis, hepatic adenomas, breast changes, GI upset, headaches, and depression.  The patient verbalized understanding of the proper use and possible adverse effects of OCPs. All of the patient's questions and concerns were addressed.
Topical Metronidazole Counseling: Metronidazole is a topical antibiotic medication. You may experience burning, stinging, redness, or allergic reactions.  Please call our office if you develop any problems from using this medication.
Clindamycin Counseling: I counseled the patient regarding use of clindamycin as an antibiotic for prophylactic and/or therapeutic purposes. Clindamycin is active against numerous classes of bacteria, including skin bacteria. Side effects may include nausea, diarrhea, gastrointestinal upset, rash, hives, yeast infections, and in rare cases, colitis.
Topical Retinoid counseling:  Patient advised to apply a pea-sized amount only at bedtime and wait 30 minutes after washing their face before applying.  If too drying, patient may add a non-comedogenic moisturizer. The patient verbalized understanding of the proper use and possible adverse effects of retinoids.  All of the patient's questions and concerns were addressed.
Low Dose Naltrexone Pregnancy And Lactation Text: Naltrexone is pregnancy category C.  There have been no adequate and well-controlled studies in pregnant women.  It should be used in pregnancy only if the potential benefit justifies the potential risk to the fetus.   Limited data indicates that naltrexone is minimally excreted into breastmilk.
High Dose Vitamin A Counseling: Side effects reviewed, pt to contact office should one occur.
Rituxan Pregnancy And Lactation Text: This medication is Pregnancy Category C and it isn't know if it is safe during pregnancy. It is unknown if this medication is excreted in breast milk but similar antibodies are known to be excreted.
Sotyktu Pregnancy And Lactation Text: There is insufficient data to evaluate whether or not Sotyktu is safe to use during pregnancy.   It is not known if Sotyktu passes into breast milk and whether or not it is safe to use when breastfeeding.  
Spevigo Counseling: I discussed with the patient the risks of Spevigo including but not limited to fatigue, nasuea, vomiting, headache, pruritus, urinary tract infection, an infusion related reactions.  The patient understands that monitoring is required including screening for tuberculosis at baseline and yearly screening thereafter while continuing Spevigo therapy. They should contact us if symptoms of infection or other concerning signs are noted.
Xolair Pregnancy And Lactation Text: This medication is Pregnancy Category B and is considered safe during pregnancy. This medication is excreted in breast milk.
Dupixent Counseling: I discussed with the patient the risks of dupilumab including but not limited to eye inflammation and irritation, cold sores, injection site reactions, allergic reactions and increased risk of parasitic infection. The patient understands that monitoring is required and they must alert us or the primary physician if symptoms of infection or other concerning signs are noted.
VTAMA Counseling: I discussed with the patient that VTAMA is not for use in the eyes, mouth or mouth. They should call the office if they develop any signs of allergic reactions to VTAMA. The patient verbalized understanding of the proper use and possible adverse effects of VTAMA.  All of the patient's questions and concerns were addressed.
Xeljanz Counseling: I discussed with the patient the risks of Xeljanz therapy including increased risk of infection, liver issues, headache, diarrhea, or cold symptoms. Live vaccines should be avoided. They were instructed to call if they have any problems.
Clindamycin Pregnancy And Lactation Text: This medication can be used in pregnancy if certain situations. Clindamycin is also present in breast milk.
Birth Control Pills Pregnancy And Lactation Text: This medication should be avoided if pregnant and for the first 30 days post-partum.
Tranexamic Acid Counseling:  Patient advised of the small risk of bleeding problems with tranexamic acid. They were also instructed to call if they developed any nausea, vomiting or diarrhea. All of the patient's questions and concerns were addressed.
Doxepin Counseling:  Patient advised that the medication is sedating and not to drive a car after taking this medication. Patient informed of potential adverse effects including but not limited to dry mouth, urinary retention, and blurry vision.  The patient verbalized understanding of the proper use and possible adverse effects of doxepin.  All of the patient's questions and concerns were addressed.
Niacinamide Counseling: I recommended taking niacin or niacinamide, also know as vitamin B3, twice daily. Recent evidence suggests that taking vitamin B3 (500 mg twice daily) can reduce the risk of actinic keratoses and non-melanoma skin cancers. Side effects of vitamin B3 include flushing and headache.
High Dose Vitamin A Pregnancy And Lactation Text: High dose vitamin A therapy is contraindicated during pregnancy and breast feeding.
Mirvaso Counseling: Mirvaso is a topical medication which can decrease superficial blood flow where applied. Side effects are uncommon and include stinging, redness and allergic reactions.
Spevigo Pregnancy And Lactation Text: The risk during pregnancy and breastfeeding is uncertain with this medication. This medication does cross the placenta. It is unknown if this medication is found in breast milk.
Adbry Counseling: I discussed with the patient the risks of tralokinumab including but not limited to eye infection and irritation, cold sores, injection site reactions, worsening of asthma, allergic reactions and increased risk of parasitic infection.  Live vaccines should be avoided while taking tralokinumab. The patient understands that monitoring is required and they must alert us or the primary physician if symptoms of infection or other concerning signs are noted.
Siliq Counseling:  I discussed with the patient the risks of Siliq including but not limited to new or worsening depression, suicidal thoughts and behavior, immunosuppression, malignancy, posterior leukoencephalopathy syndrome, and serious infections.  The patient understands that monitoring is required including a PPD at baseline and must alert us or the primary physician if symptoms of infection or other concerning signs are noted. There is also a special program designed to monitor depression which is required with Siliq.
Dupixent Pregnancy And Lactation Text: This medication likely crosses the placenta but the risk for the fetus is uncertain. This medication is excreted in breast milk.
Tranexamic Acid Pregnancy And Lactation Text: It is unknown if this medication is safe during pregnancy or breast feeding.
Vtama Pregnancy And Lactation Text: It is unknown if this medication can cause problems during pregnancy and breastfeeding.
Calcipotriene Pregnancy And Lactation Text: The use of this medication during pregnancy or lactation is not recommended as there is insufficient data.
Tazorac Counseling:  Patient advised that medication is irritating and drying.  Patient may need to apply sparingly and wash off after an hour before eventually leaving it on overnight.  The patient verbalized understanding of the proper use and possible adverse effects of tazorac.  All of the patient's questions and concerns were addressed.
Ilumya Counseling: I discussed with the patient the risks of tildrakizumab including but not limited to immunosuppression, malignancy, posterior leukoencephalopathy syndrome, and serious infections.  The patient understands that monitoring is required including a PPD at baseline and must alert us or the primary physician if symptoms of infection or other concerning signs are noted.
Xelangelinaz Pregnancy And Lactation Text: This medication is Pregnancy Category D and is not considered safe during pregnancy.  The risk during breast feeding is also uncertain.
Doxycycline Counseling:  Patient counseled regarding possible photosensitivity and increased risk for sunburn.  Patient instructed to avoid sunlight, if possible.  When exposed to sunlight, patients should wear protective clothing, sunglasses, and sunscreen.  The patient was instructed to call the office immediately if the following severe adverse effects occur:  hearing changes, easy bruising/bleeding, severe headache, or vision changes.  The patient verbalized understanding of the proper use and possible adverse effects of doxycycline.  All of the patient's questions and concerns were addressed.
Niacinamide Pregnancy And Lactation Text: These medications are considered safe during pregnancy.
Doxepin Pregnancy And Lactation Text: This medication is Pregnancy Category C and it isn't known if it is safe during pregnancy. It is also excreted in breast milk and breast feeding isn't recommended.
Spironolactone Counseling: Patient advised regarding risks of diarrhea, abdominal pain, hyperkalemia, birth defects (for female patients), liver toxicity and renal toxicity. The patient may need blood work to monitor liver and kidney function and potassium levels while on therapy. The patient verbalized understanding of the proper use and possible adverse effects of spironolactone.  All of the patient's questions and concerns were addressed.
Ebglyss Counseling: I discussed with the patient the risks of lebrikizumab including but not limited to eye inflammation and irritation, cold sores, injection site reactions, allergic reactions and increased risk of parasitic infection. The patient understands that monitoring is required and they must alert us or the primary physician if symptoms of infection or other concerning signs are noted.
Adbry Pregnancy And Lactation Text: It is unknown if this medication will adversely affect pregnancy or breast feeding.
Cantharidin Counseling:  I discussed with the patient the risks of Cantharidin including but not limited to pain, redness, burning, itching, and blistering.
Valtrex Counseling: I discussed with the patient the risks of valacyclovir including but not limited to kidney damage, nausea, vomiting and severe allergy.  The patient understands that if the infection seems to be worsening or is not improving, they are to call.
Stelara Counseling:  I discussed with the patient the risks of ustekinumab including but not limited to immunosuppression, malignancy, posterior leukoencephalopathy syndrome, and serious infections.  The patient understands that monitoring is required including a PPD at baseline and must alert us or the primary physician if symptoms of infection or other concerning signs are noted.
Zoryve Counseling:  I discussed with the patient that Zoryve is not for use in the eyes, mouth or vagina. The most commonly reported side effects include diarrhea, headache, insomnia, application site pain, upper respiratory tract infections, and urinary tract infections.  All of the patient's questions and concerns were addressed.
Dutasteride Male Counseling: Dustasteride Counseling:  I discussed with the patient the risks of use of dutasteride including but not limited to decreased libido, decreased ejaculate volume, and gynecomastia. Women who can become pregnant should not handle medication.  All of the patient's questions and concerns were addressed.
Doxycycline Pregnancy And Lactation Text: This medication is Pregnancy Category D and not consider safe during pregnancy. It is also excreted in breast milk but is considered safe for shorter treatment courses.
Simlandi Counseling:  I discussed with the patient the risks of adalimumab including but not limited to myelosuppression, immunosuppression, autoimmune hepatitis, demyelinating diseases, lymphoma, and serious infections.  The patient understands that monitoring is required including a PPD at baseline and must alert us or the primary physician if symptoms of infection or other concerning signs are noted.
Spironolactone Pregnancy And Lactation Text: This medication can cause feminization of the male fetus and should be avoided during pregnancy. The active metabolite is also found in breast milk.
Nsaids Counseling: NSAID Counseling: I discussed with the patient that NSAIDs should be taken with food. Prolonged use of NSAIDs can result in the development of stomach ulcers.  Patient advised to stop taking NSAIDs if abdominal pain occurs.  The patient verbalized understanding of the proper use and possible adverse effects of NSAIDs.  All of the patient's questions and concerns were addressed.
Hydroxyzine Counseling: Patient advised that the medication is sedating and not to drive a car after taking this medication.  Patient informed of potential adverse effects including but not limited to dry mouth, urinary retention, and blurry vision.  The patient verbalized understanding of the proper use and possible adverse effects of hydroxyzine.  All of the patient's questions and concerns were addressed.
Bimzelx Counseling:  I discussed with the patient the risks of Bimzelx including but not limited to depression, immunosuppression, allergic reactions and infections.  The patient understands that monitoring is required including a PPD at baseline and must alert us or the primary physician if symptoms of infection or other concerning signs are noted.
Ebglyss Pregnancy And Lactation Text: This medication likely crosses the placenta but the risk for the fetus is uncertain. It is unknown if this medication is excreted in breast milk.
Valtrex Pregnancy And Lactation Text: this medication is Pregnancy Category B and is considered safe during pregnancy. This medication is not directly found in breast milk but it's metabolite acyclovir is present.
Ketoconazole Counseling:   Patient counseled regarding improving absorption with orange juice.  Adverse effects include but are not limited to breast enlargement, headache, diarrhea, nausea, upset stomach, liver function test abnormalities, taste disturbance, and stomach pain.  There is a rare possibility of liver failure that can occur when taking ketoconazole. The patient understands that monitoring of LFTs may be required, especially at baseline. The patient verbalized understanding of the proper use and possible adverse effects of ketoconazole.  All of the patient's questions and concerns were addressed.
5-Fu Counseling: 5-Fluorouracil Counseling:  I discussed with the patient the risks of 5-fluorouracil including but not limited to erythema, scaling, itching, weeping, crusting, and pain.
Dutasteride Female Counseling: Dutasteride Counseling:  I discussed with the patient the risks of use of dutasteride including but not limited to decreased libido and sexual dysfunction. Explained the teratogenic nature of the medication and stressed the importance of not getting pregnant during treatment. All of the patient's questions and concerns were addressed.
Infliximab Counseling:  I discussed with the patient the risks of infliximab including but not limited to myelosuppression, immunosuppression, autoimmune hepatitis, demyelinating diseases, lymphoma, and serious infections.  The patient understands that monitoring is required including a PPD at baseline and must alert us or the primary physician if symptoms of infection or other concerning signs are noted.
Tazorac Pregnancy And Lactation Text: This medication is not safe during pregnancy. It is unknown if this medication is excreted in breast milk.
Erythromycin Counseling:  I discussed with the patient the risks of erythromycin including but not limited to GI upset, allergic reaction, drug rash, diarrhea, increase in liver enzymes, and yeast infections.
Aklief counseling:  Patient advised to apply a pea-sized amount only at bedtime and wait 30 minutes after washing their face before applying.  If too drying, patient may add a non-comedogenic moisturizer.  The most commonly reported side effects including irritation, redness, scaling, dryness, stinging, burning, itching, and increased risk of sunburn.  The patient verbalized understanding of the proper use and possible adverse effects of retinoids.  All of the patient's questions and concerns were addressed.
Opzelura Counseling:  I discussed with the patient the risks of Opzelura including but not limited to nasopharngitis, bronchitis, ear infection, eosinophila, hives, diarrhea, folliculitis, tonsillitis, and rhinorrhea.  Taken orally, this medication has been linked to serious infections; higher rate of mortality; malignancy and lymphoproliferative disorders; major adverse cardiovascular events; thrombosis; thrombocytopenia, anemia, and neutropenia; and lipid elevations.
Hydroxyzine Pregnancy And Lactation Text: This medication is not safe during pregnancy and should not be taken. It is also excreted in breast milk and breast feeding isn't recommended.
Nsaids Pregnancy And Lactation Text: These medications are considered safe up to 30 weeks gestation. It is excreted in breast milk.
Bimzelx Pregnancy And Lactation Text: This medication crosses the placenta and the safety is uncertain during pregnancy. It is unknown if this medication is present in breast milk.

## 2024-11-15 ENCOUNTER — APPOINTMENT (RX ONLY)
Dept: URBAN - METROPOLITAN AREA CLINIC 374 | Facility: CLINIC | Age: 59
Setting detail: DERMATOLOGY
End: 2024-11-15

## 2024-11-15 DIAGNOSIS — S00429A BLISTER OF FACE, NECK, AND SCALP EXCEPT EYE, WITHOUT MENTION OF INFECTION: ICD-10-CM | Status: UNCHANGED

## 2024-11-15 DIAGNOSIS — S0002XA BLISTER OF FACE, NECK, AND SCALP EXCEPT EYE, WITHOUT MENTION OF INFECTION: ICD-10-CM | Status: UNCHANGED

## 2024-11-15 DIAGNOSIS — S0032XA BLISTER OF FACE, NECK, AND SCALP EXCEPT EYE, WITHOUT MENTION OF INFECTION: ICD-10-CM | Status: UNCHANGED

## 2024-11-15 DIAGNOSIS — S0092XA BLISTER OF FACE, NECK, AND SCALP EXCEPT EYE, WITHOUT MENTION OF INFECTION: ICD-10-CM | Status: UNCHANGED

## 2024-11-15 DIAGNOSIS — S1092XA BLISTER OF FACE, NECK, AND SCALP EXCEPT EYE, WITHOUT MENTION OF INFECTION: ICD-10-CM | Status: UNCHANGED

## 2024-11-15 DIAGNOSIS — S1012XA BLISTER OF FACE, NECK, AND SCALP EXCEPT EYE, WITHOUT MENTION OF INFECTION: ICD-10-CM | Status: UNCHANGED

## 2024-11-15 DIAGNOSIS — S00521A BLISTER OF FACE, NECK, AND SCALP EXCEPT EYE, WITHOUT MENTION OF INFECTION: ICD-10-CM | Status: UNCHANGED

## 2024-11-15 DIAGNOSIS — S00522A BLISTER OF FACE, NECK, AND SCALP EXCEPT EYE, WITHOUT MENTION OF INFECTION: ICD-10-CM | Status: UNCHANGED

## 2024-11-15 PROBLEM — S00.82XA BLISTER (NONTHERMAL) OF OTHER PART OF HEAD, INITIAL ENCOUNTER: Status: ACTIVE | Noted: 2024-11-15

## 2024-11-15 PROCEDURE — ? COUNSELING

## 2024-11-15 PROCEDURE — 99212 OFFICE O/P EST SF 10 MIN: CPT | Mod: 24

## 2024-11-15 PROCEDURE — ? ADDITIONAL NOTES

## 2024-11-15 PROCEDURE — ? DIAGNOSIS COMMENT

## 2024-11-15 ASSESSMENT — LOCATION ZONE DERM: LOCATION ZONE: FACE

## 2024-11-15 ASSESSMENT — LOCATION DETAILED DESCRIPTION DERM: LOCATION DETAILED: RIGHT INFERIOR LATERAL FOREHEAD

## 2024-11-15 ASSESSMENT — LOCATION SIMPLE DESCRIPTION DERM: LOCATION SIMPLE: RIGHT FOREHEAD

## 2024-11-15 NOTE — PROCEDURE: ADDITIONAL NOTES
Additional Notes: Post-cryotherapy wound care recommendations were revisited 
Render Risk Assessment In Note?: no
Detail Level: Detailed

## 2024-11-15 NOTE — HPI: OTHER
Condition:: \"Blisters\" on the face
Please Describe Your Condition:: \"Blisters\" on the face, noted a few days ago after cryotherapy performed for the treatment of actinic keratoses.

## 2024-11-15 NOTE — PROCEDURE: DIAGNOSIS COMMENT
Comment: Secondary to cryotherapy performed for the treatment of actinic keratoses (AKs)
Render Risk Assessment In Note?: no
Detail Level: Simple

## 2025-01-08 ENCOUNTER — APPOINTMENT (OUTPATIENT)
Dept: URBAN - METROPOLITAN AREA CLINIC 374 | Facility: CLINIC | Age: 60
Setting detail: DERMATOLOGY
End: 2025-01-08

## 2025-01-08 DIAGNOSIS — S00521A BLISTER OF FACE, NECK, AND SCALP EXCEPT EYE, WITHOUT MENTION OF INFECTION: ICD-10-CM | Status: RESOLVED

## 2025-01-08 DIAGNOSIS — S00429A BLISTER OF FACE, NECK, AND SCALP EXCEPT EYE, WITHOUT MENTION OF INFECTION: ICD-10-CM | Status: RESOLVED

## 2025-01-08 DIAGNOSIS — L57.0 ACTINIC KERATOSIS: ICD-10-CM | Status: INADEQUATELY CONTROLLED

## 2025-01-08 DIAGNOSIS — S0032XA BLISTER OF FACE, NECK, AND SCALP EXCEPT EYE, WITHOUT MENTION OF INFECTION: ICD-10-CM | Status: RESOLVED

## 2025-01-08 DIAGNOSIS — S0002XA BLISTER OF FACE, NECK, AND SCALP EXCEPT EYE, WITHOUT MENTION OF INFECTION: ICD-10-CM | Status: RESOLVED

## 2025-01-08 DIAGNOSIS — S00522A BLISTER OF FACE, NECK, AND SCALP EXCEPT EYE, WITHOUT MENTION OF INFECTION: ICD-10-CM | Status: RESOLVED

## 2025-01-08 DIAGNOSIS — S1012XA BLISTER OF FACE, NECK, AND SCALP EXCEPT EYE, WITHOUT MENTION OF INFECTION: ICD-10-CM | Status: RESOLVED

## 2025-01-08 DIAGNOSIS — S0092XA BLISTER OF FACE, NECK, AND SCALP EXCEPT EYE, WITHOUT MENTION OF INFECTION: ICD-10-CM | Status: RESOLVED

## 2025-01-08 DIAGNOSIS — S1092XA BLISTER OF FACE, NECK, AND SCALP EXCEPT EYE, WITHOUT MENTION OF INFECTION: ICD-10-CM | Status: RESOLVED

## 2025-01-08 PROBLEM — S00.82XA BLISTER (NONTHERMAL) OF OTHER PART OF HEAD, INITIAL ENCOUNTER: Status: ACTIVE | Noted: 2025-01-08

## 2025-01-08 PROCEDURE — ? DIAGNOSIS COMMENT

## 2025-01-08 PROCEDURE — ? LIQUID NITROGEN

## 2025-01-08 PROCEDURE — 99212 OFFICE O/P EST SF 10 MIN: CPT | Mod: 25

## 2025-01-08 PROCEDURE — ? COUNSELING

## 2025-01-08 PROCEDURE — 17000 DESTRUCT PREMALG LESION: CPT

## 2025-01-08 PROCEDURE — 17003 DESTRUCT PREMALG LES 2-14: CPT

## 2025-01-08 ASSESSMENT — LOCATION DETAILED DESCRIPTION DERM
LOCATION DETAILED: RIGHT MEDIAL MALAR CHEEK
LOCATION DETAILED: LEFT MEDIAL FOREHEAD
LOCATION DETAILED: RIGHT CENTRAL MALAR CHEEK
LOCATION DETAILED: RIGHT INFERIOR LATERAL FOREHEAD

## 2025-01-08 ASSESSMENT — LOCATION ZONE DERM: LOCATION ZONE: FACE

## 2025-01-08 ASSESSMENT — LOCATION SIMPLE DESCRIPTION DERM
LOCATION SIMPLE: LEFT FOREHEAD
LOCATION SIMPLE: RIGHT FOREHEAD
LOCATION SIMPLE: RIGHT CHEEK

## 2025-01-08 NOTE — PROCEDURE: LIQUID NITROGEN
Render Note In Bullet Format When Appropriate: No
Number Of Freeze-Thaw Cycles: 3 freeze-thaw cycles
Detail Level: Detailed
Duration Of Freeze Thaw-Cycle (Seconds): 3
Show Applicator Variable?: Yes
Post-Care Instructions: I reviewed with the patient in detail post-care instructions. Patient is to wear sunprotection, and avoid picking at any of the treated lesions. Pt may apply Vaseline to crusted or scabbing areas.
Application Tool (Optional): Liquid Nitrogen Sprayer
Consent: The patient's consent was obtained including but not limited to risks of crusting, scabbing, blistering, scarring, darker or lighter pigmentary change, recurrence, incomplete removal and infection.

## (undated) DEVICE — SEAL UNIVERSAL 5MM-8MM XI

## (undated) DEVICE — DRIVER NEEDLE MEGACUT SUTURECUT XI REPOSABLE

## (undated) DEVICE — SURGIFLO HEMOSTATIC MATRIX 8ML

## (undated) DEVICE — TRAY URINE METER FOLEY NON-SILVER

## (undated) DEVICE — GAUZE 8X4 16 PLY RFID DOUBLE XRAY

## (undated) DEVICE — ELEVATOR UTERINE V-CARE STD CERVICAL CUP

## (undated) DEVICE — TROCAR XCEL BLADELESS 5MM X 100MM LONG

## (undated) DEVICE — COVER CAMERA LIGHT HANDLE

## (undated) DEVICE — EVACUATOR PLUME-AWAY LAP SMOKE PKG

## (undated) DEVICE — DRAPE COLUMN DAVINCI XI

## (undated) DEVICE — SOLN IRRIG .9%SOD 1000ML

## (undated) DEVICE — SYSTEM LABELING CORRECT MEDICATION

## (undated) DEVICE — MARKER SURGICAL SKIN

## (undated) DEVICE — SYSTEM VISUALIZATION CLEARIFY

## (undated) DEVICE — FORCEPS BIPOLAR FENESTRATED XI REPOSABLE

## (undated) DEVICE — TRAY SKIN SCRUB

## (undated) DEVICE — FORCEPS PROGRASP XI REPOSABLE

## (undated) DEVICE — SOLUTION ELECTROLUBE ANTI-STICK

## (undated) DEVICE — SKIN MARKER SURGICAL

## (undated) DEVICE — ADHESIVE SKIN DERMABOND ADVANCED 0.7ML

## (undated) DEVICE — APPLICATOR CHLORAPREP 26ML ORANGE TINT

## (undated) DEVICE — DRAPE ARM DAVINCI XI

## (undated) DEVICE — NEEDLE DISP SPINAL 22GX3-1/2IN

## (undated) DEVICE — SPONGE LAP 18X18 SAFE-T RFID ENHANCED XRAY

## (undated) DEVICE — Device

## (undated) DEVICE — SHEARS CRVD HOT XI  REPOSABLE

## (undated) DEVICE — PINPOINT ICG PAQ

## (undated) DEVICE — GLOVE SZ 6.5 LINER PROTEXIS PI BL

## (undated) DEVICE — SHEARS TIP COVER DAVINCI ONE USE

## (undated) DEVICE — TUBING CYSTO BLADDER IRRIG

## (undated) DEVICE — SUTURE VLOC 2-0 90 VIOLET 1X9 GS-21

## (undated) DEVICE — TUBING INSUFFLATION PNEUMOSURE HEATED HIGH FLOW

## (undated) DEVICE — SUTURE MONOCRYL 4-0 Y426H PS-2 27IN

## (undated) DEVICE — SANI-SERVE SLUSH DRAPE SUBSTIT

## (undated) DEVICE — OBTURATOR BLADELESS 8MM XI

## (undated) DEVICE — PAD GROUND ELECTROSURGICAL W/CORD

## (undated) DEVICE — COVER LIGHTHANDLE

## (undated) DEVICE — MANIFOLD SINGLE PORT NEPTUNE

## (undated) DEVICE — PAD POSITIONING XL W/ARM PROTECTORS

## (undated) DEVICE — SURGIFLO ENDOSCOPIC APPLICATOR 34CM

## (undated) DEVICE — NEEDLE DISP HYPO 25GX1-1/2IN

## (undated) DEVICE — GLOVE SZ 6 PROTEXIS PI

## (undated) DEVICE — STRYKEFLOW 2 W/ DISP TIP